# Patient Record
Sex: FEMALE | NOT HISPANIC OR LATINO | Employment: OTHER | URBAN - NONMETROPOLITAN AREA
[De-identification: names, ages, dates, MRNs, and addresses within clinical notes are randomized per-mention and may not be internally consistent; named-entity substitution may affect disease eponyms.]

---

## 2021-01-22 ENCOUNTER — IMMUNIZATION (OUTPATIENT)
Dept: VACCINE CLINIC | Facility: HOSPITAL | Age: 76
End: 2021-01-22

## 2021-01-22 PROCEDURE — 0001A: CPT | Performed by: THORACIC SURGERY (CARDIOTHORACIC VASCULAR SURGERY)

## 2021-01-22 PROCEDURE — 91300 HC SARSCOV02 VAC 30MCG/0.3ML IM: CPT | Performed by: THORACIC SURGERY (CARDIOTHORACIC VASCULAR SURGERY)

## 2021-02-12 ENCOUNTER — IMMUNIZATION (OUTPATIENT)
Dept: VACCINE CLINIC | Facility: HOSPITAL | Age: 76
End: 2021-02-12

## 2021-02-12 PROCEDURE — 0002A: CPT | Performed by: THORACIC SURGERY (CARDIOTHORACIC VASCULAR SURGERY)

## 2021-02-12 PROCEDURE — 91300 HC SARSCOV02 VAC 30MCG/0.3ML IM: CPT | Performed by: THORACIC SURGERY (CARDIOTHORACIC VASCULAR SURGERY)

## 2023-09-11 ENCOUNTER — TELEPHONE (OUTPATIENT)
Dept: NEUROLOGY | Facility: CLINIC | Age: 78
End: 2023-09-11

## 2023-09-11 NOTE — TELEPHONE ENCOUNTER
Provider: SAMANTHA   Caller: TOI WILDER   Relationship to Patient: PATIENT     Phone Number: [phone]  Reason for Call: PATIENT CALLED IN STATES THAT THE ED DETERMINED THAT SHE HAS HAD A TIA AND WANTS HER TO BE SEEN BY MIGUEL YUN. PATIENT STATES THEY DID GIVE HER A REFERRAL TO COME SEE OUR OFFICE AS WELL.     PLEASE CALL PATIENT AND ADVISE    Residential stability/Employment stability/Insight into violence risk and need for management/treatment/Good treatment response/compliance

## 2024-01-18 ENCOUNTER — OFFICE VISIT (OUTPATIENT)
Dept: ORTHOPEDIC SURGERY | Facility: CLINIC | Age: 79
End: 2024-01-18
Payer: MEDICARE

## 2024-01-18 VITALS
BODY MASS INDEX: 27.49 KG/M2 | WEIGHT: 165 LBS | DIASTOLIC BLOOD PRESSURE: 82 MMHG | SYSTOLIC BLOOD PRESSURE: 132 MMHG | HEIGHT: 65 IN

## 2024-01-18 DIAGNOSIS — S46.011A TRAUMATIC COMPLETE TEAR OF RIGHT ROTATOR CUFF, INITIAL ENCOUNTER: Primary | ICD-10-CM

## 2024-01-18 DIAGNOSIS — M19.019 AC JOINT ARTHROPATHY: ICD-10-CM

## 2024-01-18 DIAGNOSIS — M94.211 CHONDROMALACIA OF RIGHT SHOULDER: ICD-10-CM

## 2024-01-18 RX ORDER — LIDOCAINE HYDROCHLORIDE 10 MG/ML
4 INJECTION, SOLUTION EPIDURAL; INFILTRATION; INTRACAUDAL; PERINEURAL
Status: COMPLETED | OUTPATIENT
Start: 2024-01-18 | End: 2024-01-18

## 2024-01-18 RX ORDER — TRIAMCINOLONE ACETONIDE 40 MG/ML
80 INJECTION, SUSPENSION INTRA-ARTICULAR; INTRAMUSCULAR
Status: COMPLETED | OUTPATIENT
Start: 2024-01-18 | End: 2024-01-18

## 2024-01-18 RX ADMIN — TRIAMCINOLONE ACETONIDE 80 MG: 40 INJECTION, SUSPENSION INTRA-ARTICULAR; INTRAMUSCULAR at 15:03

## 2024-01-18 RX ADMIN — LIDOCAINE HYDROCHLORIDE 4 ML: 10 INJECTION, SOLUTION EPIDURAL; INFILTRATION; INTRACAUDAL; PERINEURAL at 15:03

## 2024-01-18 NOTE — PROGRESS NOTES
"Subjective:     Patient ID: Huyen Alvarez is a 78 y.o. female.    Chief Complaint:  Right shoulder pain, new patient  History of Present Illness  Huyen Alvarez presents to clinic today for evaluation of right shoulder pain, new onset back in 09/2023.     She states that she had a TIA at that time. She is unsure of exactly what happened but feels like she may have fallen onto her right shoulder, as she has had significant pain to her right shoulder since that time, localized to the anterolateral aspect of the shoulder. She rates it as a 6 out of 10, aching in nature with occasional sharp pain. She has noticed some associated throbbing, mild radiation down the anterolateral aspect of the upper extremity, but not below the level of the elbow. It is worse with overhead lifting, pushing, pulling activities, as well as any resisted activities with the upper extremity and does note some associated weakness. She denies any janice numbness or tingling. She denies any fever, chills, or sweats. She has had no significant improvement with home exercise for the last 6 weeks and anti-inflammatory medications. She denies prior injury to her right shoulder, and she is right hand dominant.     Social History     Occupational History    Not on file   Tobacco Use    Smoking status: Never     Passive exposure: Never    Smokeless tobacco: Never   Vaping Use    Vaping Use: Never used   Substance and Sexual Activity    Alcohol use: Never    Drug use: Never    Sexual activity: Defer      History reviewed. No pertinent past medical history.  Past Surgical History:   Procedure Laterality Date    CYST REMOVAL         Family History   Problem Relation Age of Onset    Cancer Mother          Review of Systems        Objective:  Vitals:    01/18/24 1356   BP: 132/82   BP Location: Right arm   Weight: 74.8 kg (165 lb)   Height: 165.1 cm (65\")         01/18/24  1356   Weight: 74.8 kg (165 lb)     Body mass index is 27.46 kg/m².  Physical " Exam    Vital signs reviewed.   General: No acute distress, alert and oriented  Eyes: conjunctiva clear; pupils equally round and reactive  ENT: external ears and nose atraumatic; oropharynx clear  CV: no peripheral edema  Resp: normal respiratory effort  Skin: no rashes or wounds; normal turgor  Psych: mood and affect appropriate; recent and remote memory intact        Ortho Exam     Right shoulder-  Mild tenderness along the anterior and posterior glenohumeral joint line with minimal crepitus  FF- Active- 160 degrees  Strength- 4-/5  ER- Active- 45 degrees  Strength- 4/5  IR T12  Strength- 4+/5  Empty can test- Positive  Drop arm test- Negative  Ext rotation lag sign- Negative  Neer's sign- Positive  Shah- Positive  Cross arm test- Negative  Tenderness over AC joint- Mild  Brisk cap refill to all digits, palpable 1+ radial pulse right wrist  Positive deltoid firing in all three components  Positive sensation to light touch to all distributions, right hand symmetric to the left  Imaging:    MR shoulder right wo IV contrast  Order: [id number]  Narrative    HISTORY: Shoulder pain.    COMPARISON: None.    TECHNIQUE:    Multiplanar, multisequence MR images of the right shoulder were performed without gadolinium contrast. Study is degraded by motion.    FINDINGS:    BONE MARROW: Overall within normal limits. Some reactive marrow changes of the greater tuberosity of the humerus.  SOFT TISSUES: Muscular and fascial planes are well-maintained. Major neurovascular structures are normal.    ACROMIOCLAVICULAR JOINT: Joint space loss associated with capsular hypertrophy and bony spurring. There is lateral acromial downsloping. Type 1 acromion.  SUBACROMIAL/SUBDELTOID BURSA: Moderate bursal distention.    ROTATOR CUFF: Full thickness tear of the anterior half of the supraspinatus tendon retracted over the humeral head. Articular surface partial tear of the remaining tendon. Rotator cuff is otherwise intact. There is some  subscapularis tendinosis and infraspinatus tendinosis.  BICEPS TENDON: Properly position the bicipital groove. Linear tear suspected at the attachment communicating labral tear.    GLENOHUMERAL JOINT: Currently anatomically aligned. Moderate joint effusion with moderate fluid distention of subcoracoid bursa. Overall the cartilage surfaces appear maintained.  LABRUM: L shaped linear tear of the anterosuperior through posterior superior labral tear which does communicate with the biceps anchor.  CAPSULAR LIGAMENTS: IGHL is intact.    IMPRESSION:  1. Motion degraded and limited exam.    2. Linear tear of the superior portions of labrum with involvement of the bicipital anchor. Moderate glenohumeral joint effusion with bursal distention.    3. Full-thickness tear of the anterior supraspinatus. Tendinosis of infraspinatus and subscapularis.    4. 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 Please correlate for symptoms of impingement.    Dictated by: Dane Will MD Signed by Dane Will MD on 1/10/2024 7:57  ##### Final #####    Dictated by:    Dane Will  Dictated DT/TM: 01/10/2024 7:57 am  Interpreted and electronically signed by:  Dane Will  Signed DT/TM:  01/10/2024 7:57 am  Exam End: --    Specimen Collected: 24 12:08 Last Resulted: 01/10/24 07:59   Received From: [facility]  Result Received: 01/15/24 00:05    XR Shoulder Comp Min 2 Vw RT  Order: [id number]  Narrative    REVIEWING YOUR TEST RESULTS IN [facility] IS NOT A SUBSTITUTE FOR DISCUSSING THOSE RESULTS WITH YOUR HEALTH CARE PROVIDER.  PLEASE CONTACT YOUR PROVIDER VIA [facility] TO DISCUSS ANY QUESTIONS OR CONCERNS YOU MAY HAVE REGARDING THESE TEST RESULTS.    RADIOLOGY REPORT    FACILITY:  [facility]  UNIT/AGE/GENDER: OSCAR  OP      AGE:78 Y          SEX:F  PATIENT NAME/:  TOI WILDER W    1945  UNIT NUMBER:  [id number]  ACCOUNT NUMBER:   [id number]  ACCESSION NUMBER:  [id number]    EXAMINATION: XR SHOULDER COMP MIN 2 VW RT    HISTORY: Right shoulder pain    FINDINGS: 4 views of the right shoulder are submitted for  interpretation without comparison.    There is moderate right acromioclavicular joint osteoarthritis.  Glenohumeral joint space is normal. Normal alignment. No acute  fracture.    IMPRESSION:    1. Moderate right acromioclavicular joint osteoarthritis.      Dictated by: Jose Pretty M.D.    Images and Report reviewed and interpreted by: Jose Pretty M.D.    <PS><Electronically signed by: Jose Pretty M.D.>  11/27/2023 1138    D: 11/27/2023 1136  T: 11/27/2023 1136  All Measurements     Exam End: 11/27/23 11:24    Specimen Collected: 11/27/23 11:36 Last Resulted: 11/27/23 11:39   Received From: [facility]  Result Received: 01/18/24 13:46       Review of outside x-rays right shoulder as well as MRI right shoulder including review of imaging as well as radiology report indicates full-thickness partial width tear of the anterior portion of the supraspinatus, mild chondromalacia of the glenohumeral joint on both x-ray and MRI with glenohumeral effusion noted.  Moderate degenerative change the AC joint.  Moderate degenerative tearing of superior labrum at biceps tendon anchor site.    Assessment:        1. Traumatic complete tear of right rotator cuff, initial encounter    2. Chondromalacia of right shoulder    3. AC joint arthropathy           Plan:  - Large Joint Arthrocentesis: R subacromial bursa on 1/18/2024 3:03 PM  Indications: pain  Details: 22 G needle, lateral approach  Medications: 80 mg triamcinolone acetonide 40 MG/ML; 4 mL lidocaine PF 1% 1 %  Outcome: tolerated well, no immediate complications  Procedure, treatment alternatives, risks and benefits explained, specific risks discussed. Consent was given by the patient. Immediately prior to procedure a time out was called to verify the correct patient, procedure,  equipment, support staff and site/side marked as required. Patient was prepped and draped in the usual sterile fashion.             1. I discussed treatment options at length with patient at today's visit. I reviewed with her that she does have a partial width full thickness tear of the anterior portion of her supraspinatus based on review of her MRI. We did discuss option for rotator cuff repair. She would like to hold off on surgical treatment at this time.  2. We also discussed option for subacromial injection and continued home exercises to try to strengthen the remaining portions of her cuff that are intact. She was in agreement with this plan.  3. Patient would like to proceed with cortisone injection today to the right shoulder subacromial space. Recommended limited use of affected extremity for the next 24 hours to only essential activities other than work on general active and passive motion. Recommended supplementing with ice and soft tissue massage. Discussed with patient that they should see results in 5 to7 days, if no improvement in 5 to 6 weeks I have asked them to call the office to review other options. Patient should call office immediately if they notice redness, warmth, fevers, chills, or residual numbness or tingling for greater than 6 hours after injection.  4. She is to continue with home exercise for periscapular strengthening and therapy program for rotator cuff deficient shoulder.  5. She will follow up in 3 months or sooner if needed.      Huyen Jones was in agreement with plan and had all questions answered.     Orders:  Orders Placed This Encounter   Procedures    - Large Joint Arthrocentesis: R subacromial bursa    XR Shoulder 2+ View Right       Medications:  No orders of the defined types were placed in this encounter.      Followup:  No follow-ups on file.    Diagnoses and all orders for this visit:    1. Traumatic complete tear of right rotator cuff, initial encounter (Primary)  -      XR Shoulder 2+ View Right    2. Chondromalacia of right shoulder    3. AC joint arthropathy    Other orders  -     - Large Joint Arthrocentesis: R subacromial bursa          Dictated utilizing Dragon dictation     Transcribed from ambient dictation for Vin Almanza MD by Solange Alvarez.  01/18/24   15:26 EST    Patient or patient representative verbalized consent to the visit recording.  I have personally performed the services described in this document as transcribed by the above individual, and it is both accurate and complete.

## 2024-03-29 ENCOUNTER — TELEPHONE (OUTPATIENT)
Dept: ORTHOPEDIC SURGERY | Facility: CLINIC | Age: 79
End: 2024-03-29
Payer: MEDICARE

## 2024-03-29 NOTE — TELEPHONE ENCOUNTER
CALLED PATIENT, LEFT VOICEMAIL FOR PATIENT  STATED WE NEED TO CHANGE HER LOCATION FOR HER APPOINTMENT ON 4- AS DR SUAREZ WILL NOT BE AT [facility] DUE TO STAFFING ISSUES. ASKED PATIENT TO CALL OUR OFFICE BACK.

## 2024-04-01 ENCOUNTER — TELEPHONE (OUTPATIENT)
Dept: ORTHOPEDIC SURGERY | Facility: CLINIC | Age: 79
End: 2024-04-01
Payer: MEDICARE

## 2024-04-01 NOTE — TELEPHONE ENCOUNTER
SECOND ATTEMPT;  CALLED PATIENT AGAIN, LEFT VOICEMAIL. EXPLAINED WE NEEDED TO MOVE APPOINTMENT ON 4- AT [place] TO [place], SAME TIME DUE TO STAFFING ISSUES.

## 2024-04-18 ENCOUNTER — OFFICE VISIT (OUTPATIENT)
Dept: ORTHOPEDIC SURGERY | Facility: CLINIC | Age: 79
End: 2024-04-18
Payer: MEDICARE

## 2024-04-18 VITALS — WEIGHT: 165 LBS | BODY MASS INDEX: 27.49 KG/M2 | HEIGHT: 65 IN

## 2024-04-18 DIAGNOSIS — M19.019 AC JOINT ARTHROPATHY: ICD-10-CM

## 2024-04-18 DIAGNOSIS — S46.011A TRAUMATIC COMPLETE TEAR OF RIGHT ROTATOR CUFF, INITIAL ENCOUNTER: Primary | ICD-10-CM

## 2024-04-18 DIAGNOSIS — M94.211 CHONDROMALACIA OF RIGHT SHOULDER: ICD-10-CM

## 2024-04-18 PROCEDURE — 73030 X-RAY EXAM OF SHOULDER: CPT | Performed by: ORTHOPAEDIC SURGERY

## 2024-04-18 PROCEDURE — 20610 DRAIN/INJ JOINT/BURSA W/O US: CPT | Performed by: ORTHOPAEDIC SURGERY

## 2024-04-18 PROCEDURE — 99213 OFFICE O/P EST LOW 20 MIN: CPT | Performed by: ORTHOPAEDIC SURGERY

## 2024-04-18 RX ORDER — ATORVASTATIN CALCIUM 20 MG/1
1 TABLET, FILM COATED ORAL DAILY
COMMUNITY
Start: 2024-03-06

## 2024-04-18 RX ADMIN — LIDOCAINE HYDROCHLORIDE 4 ML: 10 INJECTION, SOLUTION EPIDURAL; INFILTRATION; INTRACAUDAL; PERINEURAL at 11:41

## 2024-04-18 RX ADMIN — TRIAMCINOLONE ACETONIDE 80 MG: 40 INJECTION, SUSPENSION INTRA-ARTICULAR; INTRAMUSCULAR at 11:41

## 2024-04-18 NOTE — PROGRESS NOTES
"Subjective:     Patient ID: Huyen Alvarez is a 78 y.o. female.    Chief Complaint:  Follow-up right shoulder pain, rotator cuff tear, chondromalacia, AC joint arthropathy   Last injection-right shoulder subacromial bursa-1/18/2024    History of Present Illness,   Patient returns to clinic today stating for follow-up evaluation in regards to her right shoulder.    She reports that her previous injection provided significant relief for approximately 3 months. However, last week, she experienced an immediate onset of increased pain after reaching for a plate in her kitchen and suddenly moving her right arm. The pain has been relatively consistent since then. She rates her current pain level as moderate in intensity, described as aching with occasional sharp pain, particularly during any lifting, pushing, or pulling activities. The pain is localized to the anterolateral arm and shoulder, accompanied by mild radiation down the lateral arm and just below the elbow to the mid forearm region. She denies experiencing any associated numbness or tingling, fevers, chills, or sweats.      Social History     Occupational History    Not on file   Tobacco Use    Smoking status: Never     Passive exposure: Never    Smokeless tobacco: Never   Vaping Use    Vaping status: Never Used   Substance and Sexual Activity    Alcohol use: Never    Drug use: Never    Sexual activity: Defer      History reviewed. No pertinent past medical history.  Past Surgical History:   Procedure Laterality Date    CYST REMOVAL         Family History   Problem Relation Age of Onset    Cancer Mother          Review of Systems        Objective:  Vitals:    04/18/24 1016   Weight: 74.8 kg (165 lb)   Height: 165.1 cm (65\")         04/18/24  1016   Weight: 74.8 kg (165 lb)     Body mass index is 27.46 kg/m².  Physical Exam    Vital signs reviewed.   General: No acute distress, alert and oriented  Eyes: conjunctiva clear; pupils equally round and reactive  ENT: " external ears and nose atraumatic; oropharynx clear  CV: no peripheral edema  Resp: normal respiratory effort  Skin: no rashes or wounds; normal turgor  Psych: mood and affect appropriate; recent and remote memory intact        Ortho Exam     Right shoulder-active forward flexion 160 degrees with 4 out of 5 strength, active external rotation 45 degrees, 4-5 strength.  Internal rotation T9, 4+ out of 5 strength on belly press test.  Positive Shah and Neer's, positive empty can test, negative drop arm test, negative external rotation lag sign.    Imaging:  Right Shoulder X-Ray  Indication: Pain  AP, scapular Y, and axillary lateral views    Findings:  No fracture  No bony lesion  Normal soft tissues  Mild glenohumeral joint space narrowing, mild humeral head elevation noted.    Compared to prior x-rays    Assessment:        1. Traumatic complete tear of right rotator cuff, initial encounter    2. Chondromalacia of right shoulder    3. AC joint arthropathy           Plan:    - Large Joint Arthrocentesis: R subacromial bursa on 4/18/2024 11:41 AM  Indications: pain  Details: 22 G needle, lateral approach  Medications: 80 mg triamcinolone acetonide 40 MG/ML; 4 mL lidocaine PF 1% 1 %  Outcome: tolerated well, no immediate complications  Procedure, treatment alternatives, risks and benefits explained, specific risks discussed. Consent was given by the patient. Immediately prior to procedure a time out was called to verify the correct patient, procedure, equipment, support staff and site/side marked as required. Patient was prepped and draped in the usual sterile fashion.           1. Discussed treatment options at length with patient at today's visit. discussed treatment options with patient. Reviewed options for shoulder arthroplasty, physical therapy, and repeat injection. She would like to proceed with the injection again today and continue with home exercises.   2. Patient would like to proceed with cortisone  injection today to the right shoulder subacromial space. Recommended limited use of affected extremity for the next 24 hours to only essential activities other than work on general active and passive motion. Recommended supplementing with ice and soft tissue massage. Discussed with patient that they should see results in 5 to 7 days if no improvement in 5 to 6 weeks I have asked them to call the office to review other options. The patient should call the office immediately if they notice redness, warmth, fevers, chills, or residual numbness or tingling for greater than 6 hours after injection.  3. Continue with home exercises, work on range of motion and strength as tolerated.   4. Follow up. As needed if she has recurrence of symptoms.     The patient was in agreement with plan and had all questions answered.      Huyen Alvarez was in agreement with plan and had all questions answered.     Orders:  Orders Placed This Encounter   Procedures    - Large Joint Arthrocentesis: R subacromial bursa    XR Shoulder 2+ View Right       Medications:  No orders of the defined types were placed in this encounter.      Followup:  Return if symptoms worsen or fail to improve.    Diagnoses and all orders for this visit:    1. Traumatic complete tear of right rotator cuff, initial encounter (Primary)  -     XR Shoulder 2+ View Right    2. Chondromalacia of right shoulder  -     XR Shoulder 2+ View Right    3. AC joint arthropathy  -     XR Shoulder 2+ View Right    Other orders  -     - Large Joint Arthrocentesis: R subacromial bursa          Dictated utilizing Dragon dictation     Transcribed from ambient dictation for Vin Almanza MD by Myrna Tellez.  04/18/24   12:27 EDT    Patient or patient representative verbalized consent to the visit recording.  I have personally performed the services described in this document as transcribed by the above individual, and it is both accurate and complete.

## 2024-04-22 RX ORDER — TRIAMCINOLONE ACETONIDE 40 MG/ML
80 INJECTION, SUSPENSION INTRA-ARTICULAR; INTRAMUSCULAR
Status: COMPLETED | OUTPATIENT
Start: 2024-04-18 | End: 2024-04-18

## 2024-04-22 RX ORDER — LIDOCAINE HYDROCHLORIDE 10 MG/ML
4 INJECTION, SOLUTION EPIDURAL; INFILTRATION; INTRACAUDAL; PERINEURAL
Status: COMPLETED | OUTPATIENT
Start: 2024-04-18 | End: 2024-04-18

## 2024-09-18 ENCOUNTER — OFFICE VISIT (OUTPATIENT)
Dept: ORTHOPEDIC SURGERY | Facility: CLINIC | Age: 79
End: 2024-09-18
Payer: MEDICARE

## 2024-09-18 VITALS — BODY MASS INDEX: 27.16 KG/M2 | WEIGHT: 163 LBS | HEIGHT: 65 IN

## 2024-09-18 DIAGNOSIS — M19.019 AC JOINT ARTHROPATHY: ICD-10-CM

## 2024-09-18 DIAGNOSIS — S46.011A TRAUMATIC COMPLETE TEAR OF RIGHT ROTATOR CUFF, INITIAL ENCOUNTER: Primary | ICD-10-CM

## 2024-09-18 DIAGNOSIS — M94.211 CHONDROMALACIA OF RIGHT SHOULDER: ICD-10-CM

## 2024-09-18 RX ORDER — ATORVASTATIN CALCIUM 40 MG/1
40 TABLET, FILM COATED ORAL
COMMUNITY
Start: 2024-08-23

## 2024-09-18 RX ADMIN — LIDOCAINE HYDROCHLORIDE 4 ML: 10 INJECTION, SOLUTION EPIDURAL; INFILTRATION; INTRACAUDAL; PERINEURAL at 11:51

## 2024-09-18 RX ADMIN — TRIAMCINOLONE ACETONIDE 80 MG: 40 INJECTION, SUSPENSION INTRA-ARTICULAR; INTRAMUSCULAR at 11:51

## 2024-09-26 RX ORDER — LIDOCAINE HYDROCHLORIDE 10 MG/ML
4 INJECTION, SOLUTION EPIDURAL; INFILTRATION; INTRACAUDAL; PERINEURAL
Status: COMPLETED | OUTPATIENT
Start: 2024-09-18 | End: 2024-09-18

## 2024-09-26 RX ORDER — TRIAMCINOLONE ACETONIDE 40 MG/ML
80 INJECTION, SUSPENSION INTRA-ARTICULAR; INTRAMUSCULAR
Status: COMPLETED | OUTPATIENT
Start: 2024-09-18 | End: 2024-09-18

## 2025-04-09 ENCOUNTER — OFFICE VISIT (OUTPATIENT)
Dept: ORTHOPEDIC SURGERY | Facility: CLINIC | Age: 80
End: 2025-04-09
Payer: MEDICARE

## 2025-04-09 VITALS — BODY MASS INDEX: 27.16 KG/M2 | HEIGHT: 65 IN | WEIGHT: 163 LBS

## 2025-04-09 DIAGNOSIS — S46.011A TRAUMATIC COMPLETE TEAR OF RIGHT ROTATOR CUFF, INITIAL ENCOUNTER: ICD-10-CM

## 2025-04-09 DIAGNOSIS — M94.211 CHONDROMALACIA OF RIGHT SHOULDER: ICD-10-CM

## 2025-04-09 DIAGNOSIS — M19.019 AC JOINT ARTHROPATHY: Primary | ICD-10-CM

## 2025-04-09 NOTE — PROGRESS NOTES
Subjective:     Patient ID: Huyen Alvarez is a 79 y.o. female.    Chief Complaint:  Follow-up right shoulder pain, rotator cuff tear, chondromalacia, AC joint arthropathy  Last injection-9/18/2024-right shoulder subacromial bursa  History of Present Illness  The patient returns to the clinic today for a follow-up evaluation regarding her right shoulder.    She reports escalating pain levels in her right shoulder, which have particularly worsened over the past 3 to 4 weeks. The pain is rated as a 7 to 8 out of 10, described as aching, and localized over the anterior, lateral, and superior aspects of the shoulder. She experiences intermittent pain, likening it to a toothache. There is no associated numbness, tingling, fevers, chills, or sweats. Despite minimal improvement with home exercises, activity modification, stretching, ice, and heat, which she has been performing for over 12 weeks, the pain persists. She experienced significant relief following her last injection approximately 6 months ago.  Social History     Occupational History   • Not on file   Tobacco Use   • Smoking status: Never     Passive exposure: Never   • Smokeless tobacco: Never   Vaping Use   • Vaping status: Never Used   Substance and Sexual Activity   • Alcohol use: Never   • Drug use: Never   • Sexual activity: Defer      Past Medical History:   Diagnosis Date   • Atrial fibrillation      Past Surgical History:   Procedure Laterality Date   • CYST REMOVAL         Family History   Problem Relation Age of Onset   • Cancer Mother          Review of Systems        Objective:  There were no vitals filed for this visit.  There were no vitals filed for this visit.  There is no height or weight on file to calculate BMI.    Physical Exam    Vital signs reviewed.   General: No acute distress, alert and oriented  Eyes: conjunctiva clear; pupils equally round and reactive  ENT: external ears and nose atraumatic; oropharynx clear  CV: no peripheral  edema  Resp: normal respiratory effort  Skin: no rashes or wounds; normal turgor  Psych: mood and affect appropriate; recent and remote memory intact       Physical Exam  The patient's right shoulder shows active forward flexion of 150 degrees with 4 out of 5 strength, external rotation of 40 degrees with 4- out of 5 strength, internal rotation to L2 with 5 out of 5 strength. Negative belly press test, negative bear hug sign, positive Antwan's can test, positive Shah, positive Neer's, mildly positive drop arm test, negative external rotation lag sign. Maximal tenderness to palpation at the anterior and posterior glenohumeral joint line with moderate crepitus. Brisk capillary refill in all digits, 2+ radial pulse in the right wrist, positive deltoid firing in all three components.        Imaging:  None today  Assessment:        1. AC joint arthropathy    2. Chondromalacia of right shoulder    3. Traumatic complete tear of right rotator cuff, initial encounter             Assessment & Plan  1. Right shoulder pain.  She reports increasing levels of pain in the right shoulder, rated as 7-8 out of 10, localized over the anterior, lateral, and superior aspects. The pain is described as aching and similar to a toothache. She has experienced minimal improvement with home exercises, activity modification, stretching, ice, and heat over the past 12 weeks. On examination, there is active forward flexion to 150 degrees, external rotation to 40 degrees, and internal rotation to L2. Strength is 4 out of 5 for forward flexion and external rotation, and 5 out of 5 for internal rotation. Positive tests include empty can test, Shah, Neer's, and mildly positive drop arm test. There is maximal tenderness to palpation at the anterior and posterior glenohumeral joint line with moderate crepitus. Treatment options were discussed at length.     Given her recurrence of symptoms, she opted for a repeat injection today. The option for  reverse shoulder arthroplasty was discussed, but she prefers to hold off on surgical treatment at this point. A referral for formal physical therapy has been sent. All questions were answered. If there is a significant recurrence of symptoms, other options including repeat injections and surgery will be considered.    PROCEDURE  A repeat injection was administered in the right shoulder.        Large Joint Arthrocentesis: R subacromial bursa  Date/Time: 5/4/2025 11:47 PM  Consent given by: patient  Site marked: site marked  Supporting Documentation  Indications: pain   Procedure Details  Location: shoulder - R subacromial bursa  Preparation: Patient was prepped and draped in the usual sterile fashion  Needle size: 22 G  Medications administered: 4 mL lidocaine 1 %, 2 mL triamcinolone acetonide 40 MG/ML  Patient tolerance: patient tolerated the procedure well with no immediate complications        Huyen Alvarez was in agreement with plan and had all questions answered.     Orders:  No orders of the defined types were placed in this encounter.      Medications:  No orders of the defined types were placed in this encounter.      Followup:  No follow-ups on file.    Diagnoses and all orders for this visit:    1. AC joint arthropathy (Primary)    2. Chondromalacia of right shoulder    3. Traumatic complete tear of right rotator cuff, initial encounter        BMI is >= 25 and <30. (Overweight) The following options were offered after discussion;: exercise counseling/recommendations          Dictated utilizing Dragon dictation     Patient or patient representative verbalized consent for the use of Ambient Listening during the visit with  Vin Almanza MD for chart documentation. 4/9/2025  13:55 EDT

## 2025-04-29 ENCOUNTER — HOSPITAL ENCOUNTER (OUTPATIENT)
Dept: PHYSICAL THERAPY | Facility: HOSPITAL | Age: 80
Setting detail: THERAPIES SERIES
Discharge: HOME | End: 2025-04-29
Payer: MEDICARE

## 2025-04-29 DIAGNOSIS — M19.019 AC JOINT ARTHROPATHY: Primary | ICD-10-CM

## 2025-04-29 DIAGNOSIS — M94.211 CHONDROMALACIA OF RIGHT SHOULDER: ICD-10-CM

## 2025-04-29 DIAGNOSIS — S46.011A TRAUMATIC TEAR OF RIGHT ROTATOR CUFF, INITIAL ENCOUNTER: ICD-10-CM

## 2025-04-29 PROCEDURE — 97161 PT EVAL LOW COMPLEX 20 MIN: CPT

## 2025-04-29 PROCEDURE — G0283 ELEC STIM OTHER THAN WOUND: HCPCS

## 2025-04-29 PROCEDURE — 97110 THERAPEUTIC EXERCISES: CPT

## 2025-04-29 NOTE — THERAPY EVALUATION
"  Outpatient Physical Therapy Ortho Initial Evaluation  [facility]     Patient Name: Huyen Alvarez  : 1945  MRN: [id number]  Today's Date: 2025        Visit Date: 2025    There is no problem list on file for this patient.       Past Medical History:   Diagnosis Date    Arthritis     Atrial fibrillation     Hypertension     Stroke     TIA (transient ischemic attack)         Past Surgical History:   Procedure Laterality Date    CYST REMOVAL         Visit Dx:     ICD-10-CM ICD-9-CM   1. AC joint arthropathy  M19.019 719.91   2. Chondromalacia of right shoulder  M94.211 733.92   3. Traumatic tear of right rotator cuff, initial encounter  S46.011A 840.4          Patient History       Row Name 25 1000             History    Chief Complaint Difficulty with daily activities;Muscle tenderness;Muscle weakness;Pain;Tightness;Joint stiffness  -LN      Type of Pain Shoulder pain  R shoulder/UT area  -LN      Date Current Problem(s) Began --  few years ago  -LN      Brief Description of Current Complaint Pt with hx of R shoulder pain; a few years ago she had a TIA and she passed out. \"I think I must have fallen on my right shoulder and injured that shoulder.\" \"It has bothered me since then.\" 1 month ago she had an episode where she couldn't lift her arm up over her head and it lasted about a day.\" Pt reports occasional throbbing/pulsating pain in R shoulder. Has occasional N/T in hands; but no other radiating pain or N/T R UE.  \"The doctor doesn't know if it could be my shoulder or maybe my neck.\" \"I have been getting cortisone injections in my R shoulder every 3-6 months and they do help.\" Last one was 1 month ago with benefit. MRI showed a linear tear of the superior portions of labrum with involvement of the bicipital anchor. Moderate glenohumeral joint effusion with bursal distention; Full-thickness tear of the anterior supraspinatus. Tendinosis of infraspinatus and subscapularis;Acromial clavicular " "osteoarthritis with lateral acromial downsloping and underlying bursitis with mass effect. Pt dx with R AC joint arthropathy; traumatic complete tear of R RC; and chondromalaica of R shoulder.  No plan for any surgery of R shoulder at this time.  -LN      Previous treatment for THIS PROBLEM Injections;Medication  cortisone injections  -LN      Patient/Caregiver Goals Relieve pain;Improve mobility;Improve strength;Know what to do to help the symptoms  -LN      Patient/Caregiver Goals Comment \"for shoulder to feel better.\"  -LN      Hand Dominance right-handed  -LN      Occupation/sports/leisure activities retired; but very active. Volunteers at her [employer]  -LN      Patient seeing anyone else for problem(s)? Ortho  -LN      How has patient tried to help current problem? cortisone injections; rest;  -LN      What clinical tests have you had for this problem? X-ray;MRI  -LN      Results of Clinical Tests MRI= Linear tear of the superior portions of labrum with involvement of the bicipital anchor. Moderate glenohumeral joint effusion with bursal distention.     3. Full-thickness tear of the anterior supraspinatus. Tendinosis of infraspinatus and subscapularis.     4. 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 Please correlate for symptoms of impingement. X-ray= No fracture  No bony lesion  Normal soft tissues  Mild humeral head elevation, mild glenohumeral joint space narrowing  -LN      Related/Recent Hospitalizations No  -LN      Surgery/Hospitalization n/a  -LN      History of Previous Related Injuries had a fall when she had a TIA & thinks she landed on her right shoulder (a few years ago)  -LN         Pain     Pain Location Shoulder  Right; UT area  -LN      Pain at Present 0  -LN      Pain at Best 0  -LN      Pain at Worst 8  -LN      Pain Frequency Intermittent  -LN      Pain Description Throbbing;Tightness  Pulsating  -LN      What Performance " Factors Make the Current Problem(s) WORSE? nothing specific flares it up but has trouble using it and lying on R side when it is flared up  -LN      What Performance Factors Make the Current Problem(s) BETTER? rest; keeping right arm close to body; putting hand on R UT area/applying pressure  -LN      Tolerance Time- Lying limited on R side when shoulder is flared up  -LN      Is your sleep disturbed? Yes  -LN      Is medication used to assist with sleep? Yes  when shoulder is flared up  -LN      What position do you sleep in? Right sidelying;Left sidelying  -LN      Difficulties at work? retired  -LN      Difficulties with ADL's? not normally  -LN      Difficulties with recreational activities? none reported  -LN         Fall Risk Assessment    Any falls in the past year: No  -LN         Services    Prior Rehab/Home Health Experiences Yes  -LN      When was the prior experience with Rehab/Home Health after she had the TIA- 1 visit; few years ago  -LN      Where was the prior experience with Rehab/Home Health at [facility]  -LN      Are you currently receiving Home Health services No  -LN      Do you plan to receive Home Health services in the near future No  -LN         Daily Activities    Primary Language English  -LN      Are you able to read Yes  -LN      Are you able to write Yes  -LN      How does patient learn best? Listening;Demonstration  -LN      Teaching needs identified Home Exercise Program;Management of Condition;Other (comment)  Risks and benefits of treatment explained to pt.  -LN      Patient is concerned about/has problems with Bed Mobility;Flexibility;Grasping objects lifting;Performing home management (household chores, shopping, care of dependents);Performing job responsibilities/community activities (work, school,;Reaching over head;Repetitive movements of the hand, arm, shoulder;Difficulty with self care (i.e. bathing, dressing, toileting:  -LN      Does patient have problems with  the following? None  -LN      Barriers to learning None  -LN      Pt Participated in POC and Goals Yes  -LN         Safety    Are you being hurt, hit, or frightened by anyone at home or in your life? No  -LN      Are you being neglected by a caregiver No  -LN      Have you had any of the following issues with N/A  -LN                User Key  (r) = Recorded By, (t) = Taken By, (c) = Cosigned By      Initials Name Provider Type    Binta Lopes, PT Physical Therapist                     PT Ortho       Row Name 04/29/25 1000       Subjective    Subjective Comments Pt does report her neck feels stiff at times, but no neck pain reported. Pain comes and goes in R shoulder/UT area. No radiating N/T reported except does report occasional N/T in B hands.  -LN       Precautions and Contraindications    Precautions/Limitations no known precautions/limitations  -LN       Subjective Pain    Able to rate subjective pain? yes  -LN    Pre-Treatment Pain Level 0  -LN    Subjective Pain Comment She did have a little soreness after evaluation; but improved after IFC/MH.  -LN       Posture/Observations    Forward Head Mild  -LN    Rounded Shoulders Bilateral:;Mild;Moderate  -LN       Shoulder Impingement/Rotator Cuff Special Tests    Full Can Test (RC Lesion) Right:;Positive  -LN    Empty Can Test (RC Lesion) Right:;Positive  -LN    Drop Arm Test (Full Thickness RC Lesion) Right:;Positive  -LN       Shoulder Girdle Palpation    Subacromial Space Right:;Tender  -LN    Supraspinatus Insertion Right:;Tender  -LN    AC Joint Right:;Tender  -LN    Upper Trap Right:;Tender;Guarded/taut;Trigger point  -LN    Levator Scapula Right:;Tender;Guarded/taut;Trigger point  -LN       Head/Neck/Trunk    Neck Extension AROM WFL  -LN    Neck Flexion AROM WFL- tightness  -LN    Neck Lt Lateral Flexion AROM 75%-tightness  -LN    Neck Rt Lateral Flexion AROM 75%-tightness  -LN    Neck Lt Rotation AROM WFL  -LN    Neck Rt Rotation AROM 75%  -LN        Right Upper Ext    Rt Shoulder Abduction AROM 130 degrees- tight  -LN    Rt Shoulder Abduction PROM 180 degrees/scaption-painfree  -LN    Rt Shoulder Flexion AROM 170 degrees  -LN    Rt Shoulder Flexion PROM 180 degrees-pain at end-range (manny in UT and scapula area)  -LN    Rt Shoulder External Rotation AROM 75 degrees-pain  -LN    Rt Shoulder External Rotation PROM 80 degrees-pain  -LN    Rt Shoulder Internal Rotation AROM 85 degrees- pain at end-range  -LN    Rt Shoulder Internal Rotation PROM 90 degrees  -LN    Rt Elbow Extension/Flexion AROM WNL  -LN    Rt Elbow Supination AROM WNL  -LN    Rt Elbow Pronation AROM WNL  -LN       MMT (Manual Muscle Testing)    General MMT Comments L shoulder 5/5; L elbow 5/5.  -LN       MMT Right Upper Ext    Rt Shoulder Flexion MMT, Gross Movement (4+/5) good plus  -LN    Rt Shoulder Extension MMT, Gross Movement (5/5) normal  -LN    Rt Shoulder ABduction MMT, Gross Movement (4-/5) good minus  pain  -LN    Rt Shoulder ADduction MMT, Gross Movement (4+/5) good plus  -LN    Rt Shoulder Internal Rotation MMT, Gross Movement (4+/5) good plus  -LN    Rt Shoulder External Rotation MMT, Gross Movement (4+/5) good plus  -LN    Rt Elbow Flexion MMT, Gross Movement: (4/5) good  -LN    Rt Elbow Extension MMT, Gross Movement: (4/5) good  -LN       Sensation    Sensation WNL? WNL  -LN    Light Touch No apparent deficits  -LN    Additional Comments no c/o any N/T in UE except occasional N/T in B hands.  -LN       Upper Extremity Flexibility    Upper Trapezius Right:;Moderately limited  -LN    Levator Scapula Right:;Moderately limited  -LN    Pect Minor Right:;Mildly limited  -LN       Transfers    Comment, (Transfers) Pt independent with all functional mobility and transfers.  -LN       Gait/Stairs (Locomotion)    Comment, (Gait/Stairs) Pt independent with gait without any AD used.  -LN              User Key  (r) = Recorded By, (t) = Taken By, (c) = Cosigned By      Initials Name  Provider Type    Binta Lopes, PT Physical Therapist                                Therapy Education  Education Details: Pt to work on HEP 1-2 x day as tolerated and use HP/CP PRN.  Given: HEP, Symptoms/condition management, Pain management, Posture/body mechanics  Program: New  How Provided: Verbal, Demonstration, Written  Provided to: Patient  Level of Understanding: Teach back education performed, Verbalized, Demonstrated      PT OP Goals       Row Name 04/29/25 1100          PT Short Term Goals    STG Date to Achieve 05/13/25  -LN     STG 1 Pt to verbally report decreased R shoulder/UT pain to <5/10 at worst with ADLs and everyday activities.  -LN     STG 2 Pt independent with initial HEP issued by therapist.  -LN     STG 3 R shoulder AROM improved to 175 degrees flexion, 150 degrees abduction, 80 degrees ER, & 90 degrees IR to allow for improved functional use of R UE without pain.  -LN     STG 4 R shoulder and elbow strength improved by 1/2 mm grade.  -LN     STG 5 R UT/levator muscle guarding decreased to minimal.  -LN        Long Term Goals    LTG Date to Achieve 05/27/25  -LN     LTG 1 Pt to verbally report decreased R shoulder/UT pain to <3/10 at worst with ADLs and everyday activities.  -LN     LTG 2 Pt independent with more advanced HEP issued by therapist.  -LN     LTG 3 R shoulder AROM improved to WFL-WNL all planes.  -LN     LTG 4 R shoulder strength improved to 4+-5/5.  -LN     LTG 5 R elbow strength improved to 5/5.  -LN     LTG 6 R UT/levator muscle guarding decreased to nominal.  -LN     LTG 7 CROM WFL & painfree all planes.  -LN        Time Calculation    PT Goal Re-Cert Due Date 05/27/25  -LN               User Key  (r) = Recorded By, (t) = Taken By, (c) = Cosigned By      Initials Name Provider Type    Binta Lopes, PT Physical Therapist                     PT Assessment/Plan       Row Name 04/29/25 1100          PT Assessment    Functional Limitations Limitation in  home management;Limitations in community activities;Limitations in functional capacity and performance;Performance in leisure activities;Performance in self-care ADL  -LN     Impairments Impaired flexibility;Joint mobility;Muscle strength;Pain;Posture;Range of motion;Sensation  -LN     Assessment Comments Pt presents with hx of R shoulder pain for a few years after she suffered a TIA and passed out and she fell onto R shoulder and has had c/o shoulder pain since then. Pt did have a MRI which showed R AC joint arthropathy, complete tear of RC/supraspinatus; superior labral tear, and RC tendinosis. Pt presents with c/o intermittent R shoulder/UT pain/throbbing with decreased R shoulder ROM, decreased R shoulder/RC/elbow strength; moderate mm guarding with tenderness palpated R UT/levator area with decreased CROM; occasional disturbed sleep and occasional decreased functional use of R shoulder if if is flared up. She did have a recent episode where she couldn't move or lift her R arm at all for a day; unsure of cause. Pt does report occasional N/T in B hands but no other radiating pain or N/T reported in UE. Pt with sx of R RCT with weakness with UT/levator mm guarding due to compensation. No signs of any nerve impingement from neck at this time.  -LN     Please refer to paper survey for additional self-reported information Yes  -LN     Rehab Potential Good  but guarded due to having chronic R shoulder pain and hx of complete RCT  -LN     Patient/caregiver participated in establishment of treatment plan and goals Yes  -LN     Patient would benefit from skilled therapy intervention Yes  -LN        PT Plan    PT Frequency 1x/week;2x/week  -LN     Predicted Duration of Therapy Intervention (PT) 4 weeks  -LN     Planned CPT's? PT EVAL LOW COMPLEXITY: 65507;PT RE-EVAL: 50546;PT THER PROC EA 15 MIN: 95896;PT MANUAL THERAPY EA 15 MIN: 69520;PT HOT OR COLD PACK TREAT MCARE;PT ELECTRICAL STIM UNATTEND: ;PT ULTRASOUND EA 15  MIN: 91392  -LN     Physical Therapy Interventions (Optional Details) home exercise program;manual therapy techniques;modalities;patient/family education;postural re-education;ROM (Range of Motion);strengthening;stretching;taping  -LN     PT Plan Comments See pt 1-2 x week for therapeutic exercise with HEP; modalities PRN (IFC/MH/CP/US); manual therapy/ STM/MFR PRN; pt and posture education; kinesiotape PRN.  -LN               User Key  (r) = Recorded By, (t) = Taken By, (c) = Cosigned By      Initials Name Provider Type    Binta Lopes, PT Physical Therapist                     Modalities       Row Name 04/29/25 1000             Moist Heat    MH Applied Yes  -LN      Location R shoulder with IFC with pt supine in hooklying.  -LN      PT Moist Heat Minutes --  15 minutes  -LN      MH S/P Rx Yes  -LN         ELECTRICAL STIMULATION    Attended/Unattended Unattended  -LN      Stimulation Type IFC  -LN      Location/Electrode Placement/Other R UT/superior shoulder (AC joint); lateral shoulder and posterior shoulder with MH.  -LN      PT E-Stim Unattended Minutes 15  -LN                User Key  (r) = Recorded By, (t) = Taken By, (c) = Cosigned By      Initials Name Provider Type    Binta Lopes, PT Physical Therapist                   OP Exercises       Row Name 04/29/25 1000             Precautions    Existing Precautions/Restrictions no known precautions/restrictions  -LN         Subjective    Subjective Comments Pt does report her neck feels stiff at times, but no neck pain reported. Pain comes and goes in R shoulder/UT area. No radiating N/T reported except does report occasional N/T in B hands.  -LN         Subjective Pain    Able to rate subjective pain? yes  -LN      Pre-Treatment Pain Level 0  -LN      Subjective Pain Comment She did have a little soreness after evaluation; but improved after IFC/MH.  -LN         Total Minutes    83129 - PT Therapeutic Exercise Minutes 15  -LN          Exercise 1    Exercise Name 1 supine wrist grab for AA shoulder flexion  -LN      Cueing 1 Verbal;Tactile;Demo  -LN      Reps 1 5  -LN      Time 1 5 sec  -LN      Additional Comments to progress to 10 reps at home as tolerated  -LN         Exercise 2    Exercise Name 2 supine cane exercise for AA shoulder ER  -LN      Cueing 2 Verbal;Tactile;Demo  -LN      Reps 2 5  -LN      Time 2 5 sec  -LN      Additional Comments to progress to 10 reps at home as tolerated  -LN         Exercise 3    Exercise Name 3 Cross body arm stretch  -LN      Cueing 3 Verbal;Tactile;Demo  -LN      Reps 3 5  -LN      Time 3 10 sec  -LN         Exercise 4    Exercise Name 4 Scapular retraction  -LN      Cueing 4 Verbal;Tactile;Demo  -LN      Reps 4 5  -LN      Time 4 10 sec  -LN      Additional Comments to progress to 10 reps at home as tolerated  -LN         Exercise 5    Exercise Name 5 Codman pendulum cw & ccw  -LN      Cueing 5 Verbal;Tactile;Demo  -LN      Reps 5 10-15 each  -LN         Exercise 6    Exercise Name 6 UT stretch (R) with hand on head  -LN      Cueing 6 Verbal;Tactile;Demo  -LN      Reps 6 3-5 reps  -LN      Time 6 10 sec  -LN                User Key  (r) = Recorded By, (t) = Taken By, (c) = Cosigned By      Initials Name Provider Type    Binta Lopes, PT Physical Therapist                                  Outcome Measure Options: Quick DASH  Quick DASH  Open a tight or new jar.: Mild Difficulty  Do heavy household chores (e.g., wash walls, wash floors): No Difficulty  Carry a shopping bag or briefcase: No Difficulty  Wash your back: No Difficulty  Use a knife to cut food: No Difficulty  Recreational activities in which you take some force or impact through your arm, should or hand (e.g. golf, hammering, tennis, etc.): Moderate Difficulty  During the past week, to what extent has your arm, shoulder, or hand problem interfered with your normal social activites with family, friends, neighbors or groups?:  Slightly  During the past week, were you limited in your work or other regular daily activities as a result of your arm, shoulder or hand problem?: Slightly Limited  Arm, Shoulder, or hand pain: Moderate  Tingling (pins and needles) in your arm, shoulder, or hand: Mild  During the past week, how much difficulty have you had sleeping because of the pain in your arm, shoulder or hand?: Mild Difficulty  Number of Questions Answered: 11  Quick DASH Score: 20.45         Time Calculation:     Start Time: 1055  Stop Time: 1207  Time Calculation (min): 72 min  Timed Charges  40473 - PT Therapeutic Exercise Minutes: 15  Untimed Charges  PT E-Stim Unattended Minutes: 15  PT Moist Heat Minutes:  (15 minutes)  Total Minutes  Timed Charges Total Minutes: 15  Untimed Charges Total Minutes: 15   Total Minutes: 30     Therapy Charges for Today       Code Description Service Date Service Provider Modifiers Qty    55986754104 HC PT THER PROC EA 15 MIN 4/29/2025 Binta Gaitan, PT GP 1    55220381729 HC PT ELECTRICAL STIM UNATTENDED 4/29/2025 Binta Gaitan, PT  1    01685420026 HC PT EVAL LOW COMPLEXITY 2 4/29/2025 Binta Gaitan, PT GP 1            PT G-Codes  Outcome Measure Options: Quick DASH  Quick DASH Score: 20.45         Binta Gaitan, PT  4/29/2025

## 2025-05-06 ENCOUNTER — HOSPITAL ENCOUNTER (OUTPATIENT)
Dept: PHYSICAL THERAPY | Facility: HOSPITAL | Age: 80
Setting detail: THERAPIES SERIES
Discharge: HOME | End: 2025-05-06
Payer: MEDICARE

## 2025-05-06 DIAGNOSIS — M19.019 AC JOINT ARTHROPATHY: Primary | ICD-10-CM

## 2025-05-06 DIAGNOSIS — M94.211 CHONDROMALACIA OF RIGHT SHOULDER: ICD-10-CM

## 2025-05-06 DIAGNOSIS — S46.011A TRAUMATIC TEAR OF RIGHT ROTATOR CUFF, INITIAL ENCOUNTER: ICD-10-CM

## 2025-05-06 PROCEDURE — 97110 THERAPEUTIC EXERCISES: CPT

## 2025-05-06 PROCEDURE — 97140 MANUAL THERAPY 1/> REGIONS: CPT

## 2025-05-06 PROCEDURE — G0283 ELEC STIM OTHER THAN WOUND: HCPCS

## 2025-05-06 NOTE — THERAPY TREATMENT NOTE
"  Outpatient Physical Therapy Ortho Treatment Note   [place]     Patient Name: Huyen Alvarez  : 1945  MRN: [id number]  Today's Date: 2025        Visit Date: 2025    Visit Dx:    ICD-10-CM ICD-9-CM   1. AC joint arthropathy  M19.019 719.91   2. Chondromalacia of right shoulder  M94.211 733.92   3. Traumatic tear of right rotator cuff, initial encounter  S46.011A 840.4       There is no problem list on file for this patient.       Past Medical History:   Diagnosis Date    Arthritis     Atrial fibrillation     Hypertension     Stroke     TIA (transient ischemic attack)         Past Surgical History:   Procedure Laterality Date    CYST REMOVAL          PT Ortho       Row Name 25 1000       Subjective    Subjective Comments Pt reports she has been exercising her shoulder but \"not as much as I should because I have been dog sitting.\" No present pain reported but reports tightness in R UT.  She reports no issues with her HEP.  -LN       Precautions and Contraindications    Precautions/Limitations no known precautions/limitations  -LN       Subjective Pain    Able to rate subjective pain? yes  -LN    Pre-Treatment Pain Level 0  -LN    Subjective Pain Comment No present pain reported.  -LN              User Key  (r) = Recorded By, (t) = Taken By, (c) = Cosigned By      Initials Name Provider Type    Binta Lopes, PT Physical Therapist                                 PT Assessment/Plan       Row Name 25 1100          PT Assessment    Assessment Comments Pt with overall decreased c/o R shoulder pain, but continues to report persistent tightness and tenderness R UT. She tolerated exercises very well with occasional cueing for proper technique. Good tolerance to AA/PROM R shoulder but still a little limited in ER. She reports benefit from MH/IFC/CP. She tolerated beginning gentle shoulder/RC strengthening/isometrics; will progress as tolerated.  -LN        PT Plan    PT Frequency " "1x/week;2x/week  -LN     PT Plan Comments Continue per POC; progress exercises as tolerated. Add isometrics for shoulder abduction as tolerated. Continue IFC/Mh/CP PRN. Pt education; manual therapy as needed. Consider trial of kinesiotape for R UT inhibition.  -LN               User Key  (r) = Recorded By, (t) = Taken By, (c) = Cosigned By      Initials Name Provider Type    Binta Lopes, PT Physical Therapist                     Modalities       Row Name 05/06/25 1100 05/06/25 1000          Precautions    Existing Precautions/Restrictions --  -LN no known precautions/restrictions  -LN        Moist Heat    MH Applied -- Yes  -LN     Location -- R shoulder with pt seated with R arm supported on a pillow  -LN     PT Moist Heat Minutes -- 10  -LN     MH Prior to Rx -- Yes  -LN        Ice    Ice Applied -- Yes  -LN     Location -- R shoulder with IFC with pt seated in chair with arm supported on a pillow.  -LN     PT Ice Rx Minutes -- --  15 minutes  -LN     Ice S/P Rx -- Yes  -LN     Patient reports will apply ice at home to involved area -- Yes  Pt to use CP & HP PRN at home  -LN        ELECTRICAL STIMULATION    Attended/Unattended --  -LN Unattended  -LN     Stimulation Type --  -LN IFC  -LN     Location/Electrode Placement/Other --  -LN R UT/superior shoulder (AC joint); lateral shoulder and posterior shoulder with CP.  -LN     PT E-Stim Unattended Minutes -- 15  -LN               User Key  (r) = Recorded By, (t) = Taken By, (c) = Cosigned By      Initials Name Provider Type    Binta Lopes, PT Physical Therapist                   OP Exercises       Row Name 05/06/25 1100 05/06/25 1000          Precautions    Existing Precautions/Restrictions --  -LN no known precautions/restrictions  -LN        Subjective    Subjective Comments -- Pt reports she has been exercising her shoulder but \"not as much as I should because I have been dog sitting.\" No present pain reported but reports tightness " in R UT.  She reports no issues with her HEP.  -LN        Subjective Pain    Able to rate subjective pain? -- yes  -LN     Pre-Treatment Pain Level -- 0  -LN     Subjective Pain Comment -- No present pain reported.  -LN        Total Minutes    08817 - PT Therapeutic Exercise Minutes 20  -LN --     40338 - PT Manual Therapy Minutes 8  -LN --        Exercise 1    Exercise Name 1 supine wrist grab for AA shoulder flexion  -LN --     Cueing 1 Verbal;Tactile;Demo  -LN --     Reps 1 10  -LN --     Time 1 5 sec  -LN --        Exercise 2    Exercise Name 2 supine cane exercise for AA shoulder ER  -LN --     Cueing 2 Verbal;Tactile;Demo  -LN --     Reps 2 10  -LN --     Time 2 5 sec  -LN --        Exercise 3    Exercise Name 3 Cross body arm stretch  -LN --     Cueing 3 Verbal;Tactile;Demo  -LN --     Reps 3 5  -LN --     Time 3 10 sec  -LN --        Exercise 4    Exercise Name 4 Scapular retraction  -LN --     Cueing 4 Verbal;Tactile;Demo  -LN --     Reps 4 10  -LN --     Time 4 5 sec  -LN --        Exercise 5    Exercise Name 5 Codman pendulum cw & ccw  -LN --     Cueing 5 Verbal;Tactile;Demo  -LN --     Reps 5 20 each  -LN --        Exercise 6    Exercise Name 6 UT stretch (R) with hand on head  -LN --     Cueing 6 Verbal;Tactile;Demo  -LN --     Reps 6 5 reps  -LN --     Time 6 10 sec  -LN --        Exercise 7    Exercise Name 7 Isometric shoulder IR  -LN --     Cueing 7 Verbal;Tactile;Demo  -LN --     Reps 7 10  -LN --     Time 7 5 sec  -LN --     Additional Comments vs other hand  -LN --        Exercise 8    Exercise Name 8 Isometric shoulder ER  -LN --     Cueing 8 Verbal;Tactile;Demo  -LN --     Reps 8 10  -LN --     Time 8 5 sec  -LN --     Additional Comments vs other hand  -LN --               User Key  (r) = Recorded By, (t) = Taken By, (c) = Cosigned By      Initials Name Provider Type    LN Binta Gaitan, PT Physical Therapist                             Manual Rx (Last 36 Hours)       Manual  Treatments       Row Name 05/06/25 1100             Total Minutes    92008 - PT Manual Therapy Minutes 8  -LN         Manual Rx 1    Manual Rx 1 Location R shoulder  -LN      Manual Rx 1 Type AA/PROM for R shoulder flexion; abduction/scaption; IR, and ER.  -LN      Manual Rx 1 Duration 5-7 reps each to tolerance  -LN                User Key  (r) = Recorded By, (t) = Taken By, (c) = Cosigned By      Initials Name Provider Type    Binta Lopes, PT Physical Therapist                        Therapy Education  Education Details: Pt to add isometric shoulder ER and IR to HEP as tolerated (vs other hand). Pt to continue with HEP 1-2 x day and use HP/CP PRN. Reminded pt to only do exercises within a painfree range.  Given: HEP, Symptoms/condition management, Pain management, Posture/body mechanics  Program: New, Reinforced, Progressed (added isometric shoulder IR & ER vs other hand)  How Provided: Verbal, Demonstration, Written  Provided to: Patient  Level of Understanding: Teach back education performed, Verbalized, Demonstrated              Time Calculation:   Start Time: 1102  Stop Time: 1155  Time Calculation (min): 53 min  Timed Charges  69845 - PT Therapeutic Exercise Minutes: 20  64465 - PT Manual Therapy Minutes: 8  Untimed Charges  PT E-Stim Unattended Minutes: 15  PT Moist Heat Minutes: 10  PT Ice Rx Minutes:  (15 minutes)  Total Minutes  Timed Charges Total Minutes: 28  Untimed Charges Total Minutes: 25   Total Minutes: 28  Therapy Charges for Today       Code Description Service Date Service Provider Modifiers Qty    38039275896 HC PT THER PROC EA 15 MIN 5/6/2025 Binta Gaitan, PT GP 1    69531963705 HC PT MANUAL THERAPY EA 15 MIN 5/6/2025 Binta Gaitan, PT GP 1    15724500398 HC PT ELECTRICAL STIM UNATTENDED 5/6/2025 Binta Gaitan, PT  1                      Binta Gaitan, PT  5/6/2025

## 2025-05-08 ENCOUNTER — HOSPITAL ENCOUNTER (OUTPATIENT)
Dept: PHYSICAL THERAPY | Facility: HOSPITAL | Age: 80
Setting detail: THERAPIES SERIES
Discharge: HOME | End: 2025-05-08
Payer: MEDICARE

## 2025-05-08 DIAGNOSIS — S46.011A TRAUMATIC TEAR OF RIGHT ROTATOR CUFF, INITIAL ENCOUNTER: ICD-10-CM

## 2025-05-08 DIAGNOSIS — M94.211 CHONDROMALACIA OF RIGHT SHOULDER: ICD-10-CM

## 2025-05-08 DIAGNOSIS — M19.019 AC JOINT ARTHROPATHY: Primary | ICD-10-CM

## 2025-05-08 PROCEDURE — G0283 ELEC STIM OTHER THAN WOUND: HCPCS

## 2025-05-08 PROCEDURE — 97110 THERAPEUTIC EXERCISES: CPT

## 2025-05-08 PROCEDURE — 97140 MANUAL THERAPY 1/> REGIONS: CPT

## 2025-05-08 NOTE — THERAPY TREATMENT NOTE
"  Outpatient Physical Therapy Ortho Treatment Note   [place]     Patient Name: Huyen Alvarez  : 1945  MRN: [id number]  Today's Date: 2025        Visit Date: 2025    Visit Dx:    ICD-10-CM ICD-9-CM   1. AC joint arthropathy  M19.019 719.91   2. Chondromalacia of right shoulder  M94.211 733.92   3. Traumatic tear of right rotator cuff, initial encounter  S46.011A 840.4       There is no problem list on file for this patient.       Past Medical History:   Diagnosis Date    Arthritis     Atrial fibrillation     Hypertension     Stroke     TIA (transient ischemic attack)         Past Surgical History:   Procedure Laterality Date    CYST REMOVAL          PT Ortho       Row Name 25 1000       Subjective    Subjective Comments \"My shoulder is doing good and I can tell this is helping.\" Pt still reports some tightness in R UT but a little less reported.  -LN       Precautions and Contraindications    Precautions/Limitations no known precautions/limitations  -LN       Subjective Pain    Able to rate subjective pain? yes  -LN    Pre-Treatment Pain Level 0  -LN    Subjective Pain Comment No present pain reported.  -LN              User Key  (r) = Recorded By, (t) = Taken By, (c) = Cosigned By      Initials Name Provider Type    Binta Lopes, PT Physical Therapist                                 PT Assessment/Plan       Row Name 25 1100          PT Assessment    Assessment Comments Pt continues with overall decreased c/o R shoulder pain, but continues to report some tightness and tenderness R UT, but is decreasing; trial of kinesiotape done today for UT inhibition.  She tolerated exercises very well with occasional cueing for proper technique. Good tolerance to AA/PROM R shoulder but still a little limited in ER.; but improved. She reports benefit from MH/IFC and from exercises. She is progressing nicely with exercises.  -LN        PT Plan    PT Frequency 1x/week;2x/week  -LN     PT " "Plan Comments Continue per POC; progress exercises as tolerated. Assess benefit of kinesiotape for R UT inhibition. Continue IFC/MH/CP PRN. Pt education; manual therapy as needed.  -LN               User Key  (r) = Recorded By, (t) = Taken By, (c) = Cosigned By      Initials Name Provider Type    Binta Lopes, PT Physical Therapist                     Modalities       Row Name 05/08/25 1000             Moist Heat    MH Applied Yes  -LN      Location R shoulder with pt seated with R arm supported on a pillow  -LN      PT Moist Heat Minutes 10  pre-Rx; 15 min post-Rx with IFC  -LN      MH Prior to Rx Yes  -LN      MH S/P Rx Yes  -LN         Ice    Location --  -LN      PT Ice Rx Minutes --  -LN      Ice S/P Rx --  -LN      Patient reports will apply ice at home to involved area --  -LN         ELECTRICAL STIMULATION    Attended/Unattended Unattended  -LN      Stimulation Type IFC  -LN      Location/Electrode Placement/Other R UT/superior shoulder (AC joint); lateral shoulder and posterior shoulder with MH.  Pt requested MH with IFC today.  -LN      PT E-Stim Unattended Minutes 15  -LN         Other Treatment Provided    Taping / Bracing Trial of kinesiotape applied for UT inhibition using 1 \"I\" strip along R UT distal to proximal. Instructed pt in use and care of kinesiotape, including safe removal. Pt to leave tape on up to 5 days and to trim/remove as needed.  -LN                User Key  (r) = Recorded By, (t) = Taken By, (c) = Cosigned By      Initials Name Provider Type    Binta Lopes, PT Physical Therapist                   OP Exercises       Row Name 05/08/25 1100 05/08/25 1000          Precautions    Existing Precautions/Restrictions -- no known precautions/restrictions  -LN        Subjective    Subjective Comments -- \"My shoulder is doing good and I can tell this is helping.\" Pt still reports some tightness in R UT but a little less reported.  -LN        Subjective Pain    Able to " rate subjective pain? -- yes  -LN     Pre-Treatment Pain Level -- 0  -LN     Subjective Pain Comment -- No present pain reported.  -LN        Total Minutes    70801 - PT Therapeutic Exercise Minutes -- 22  -LN     30357 - PT Manual Therapy Minutes 8  -LN --        Exercise 1    Exercise Name 1 -- supine wrist grab for AA shoulder flexion  -LN     Cueing 1 -- Verbal;Tactile;Demo  -LN     Reps 1 -- 10  -LN     Time 1 -- 5 sec  -LN        Exercise 2    Exercise Name 2 -- supine cane exercise for AA shoulder ER  -LN     Cueing 2 -- Verbal;Tactile;Demo  -LN     Reps 2 -- 10  -LN     Time 2 -- 5 sec  -LN        Exercise 3    Exercise Name 3 -- Cross body arm stretch  -LN     Cueing 3 -- Verbal;Tactile;Demo  -LN     Reps 3 -- 5  -LN     Time 3 -- 10 sec  -LN        Exercise 4    Exercise Name 4 -- Scapular retraction  -LN     Cueing 4 -- Verbal;Tactile;Demo  -LN     Reps 4 -- 10  -LN     Time 4 -- 5 sec  -LN        Exercise 5    Exercise Name 5 -- Codman pendulum cw & ccw  -LN     Cueing 5 -- Verbal;Tactile;Demo  -LN     Reps 5 -- 20 each  -LN        Exercise 6    Exercise Name 6 -- UT stretch (R) with hand on head  -LN     Cueing 6 -- Verbal;Tactile;Demo  -LN     Reps 6 -- 5 reps  -LN     Time 6 -- 10 sec  -LN        Exercise 7    Exercise Name 7 -- Isometric shoulder IR  -LN     Cueing 7 -- Verbal;Tactile;Demo  -LN     Reps 7 -- 10  -LN     Time 7 -- 5 sec  -LN     Additional Comments -- vs other hand  -LN        Exercise 8    Exercise Name 8 -- Isometric shoulder ER  -LN     Cueing 8 -- Verbal;Tactile;Demo  -LN     Reps 8 -- 10  -LN     Time 8 -- 5 sec  -LN     Additional Comments -- vs other hand  -LN        Exercise 9    Exercise Name 9 -- Isometric shoulder abduction  -LN     Cueing 9 -- Verbal;Tactile;Demo  -LN     Reps 9 -- 10  -LN     Time 9 -- 5 sec  -LN     Additional Comments -- vs other hand  -LN               User Key  (r) = Recorded By, (t) = Taken By, (c) = Cosigned By      Initials Name Provider Type    LN  Binta Gaitan, PT Physical Therapist                             Manual Rx (Last 36 Hours)       Manual Treatments       Row Name 05/08/25 1100             Total Minutes    60275 - PT Manual Therapy Minutes 8  -LN         Manual Rx 1    Manual Rx 1 Location R shoulder  -LN      Manual Rx 1 Type AA/PROM for R shoulder flexion; abduction/scaption; IR, and ER.  -LN      Manual Rx 1 Duration 7-8 reps each to tolerance  -LN                User Key  (r) = Recorded By, (t) = Taken By, (c) = Cosigned By      Initials Name Provider Type    Binta Lopes, PT Physical Therapist                        Therapy Education  Education Details: Pt to add isometric shoulder abduciton vs other hand to HEP as tolerated. Pt to continue with HEP 1-2 x day and use HP/CP PRN.  Given: HEP, Symptoms/condition management, Pain management, Posture/body mechanics  Program: New, Reinforced, Progressed (added isometric shoulder abduciton vs other hand)  How Provided: Verbal, Demonstration, Written  Provided to: Patient  Level of Understanding: Teach back education performed, Verbalized, Demonstrated              Time Calculation:   Start Time: 1055  Stop Time: 1150  Time Calculation (min): 55 min  Timed Charges  48090 - PT Therapeutic Exercise Minutes: 22  22979 - PT Manual Therapy Minutes: 8  Untimed Charges  PT E-Stim Unattended Minutes: 15  PT Moist Heat Minutes: 10 (pre-Rx; 15 min post-Rx with IFC)  Total Minutes  Timed Charges Total Minutes: 8  Untimed Charges Total Minutes: 25   Total Minutes: 8  Therapy Charges for Today       Code Description Service Date Service Provider Modifiers Qty    48558701402 HC PT THER PROC EA 15 MIN 5/8/2025 Binta Gaitan, PT GP 1    54972152543 HC PT MANUAL THERAPY EA 15 MIN 5/8/2025 Binta Gaitan, PT GP 1    61349460329 HC PT ELECTRICAL STIM UNATTENDED 5/8/2025 Binta Gaitan, PT  1                      iBnta Gaitan, PT  5/8/2025

## 2025-05-15 ENCOUNTER — HOSPITAL ENCOUNTER (OUTPATIENT)
Dept: PHYSICAL THERAPY | Facility: HOSPITAL | Age: 80
Setting detail: THERAPIES SERIES
Discharge: HOME | End: 2025-05-15
Payer: MEDICARE

## 2025-05-15 DIAGNOSIS — M19.019 AC JOINT ARTHROPATHY: Primary | ICD-10-CM

## 2025-05-15 DIAGNOSIS — M94.211 CHONDROMALACIA OF RIGHT SHOULDER: ICD-10-CM

## 2025-05-15 DIAGNOSIS — S46.011A TRAUMATIC TEAR OF RIGHT ROTATOR CUFF, INITIAL ENCOUNTER: ICD-10-CM

## 2025-05-15 PROCEDURE — G0283 ELEC STIM OTHER THAN WOUND: HCPCS

## 2025-05-15 PROCEDURE — 97110 THERAPEUTIC EXERCISES: CPT

## 2025-05-15 PROCEDURE — 97140 MANUAL THERAPY 1/> REGIONS: CPT

## 2025-05-15 NOTE — THERAPY TREATMENT NOTE
Outpatient Physical Therapy Ortho Treatment Note   [facility]     Patient Name: Huyen Alvarez  : 1945  MRN: [id number]  Today's Date: 5/15/2025        Visit Date: 05/15/2025    Visit Dx:    ICD-10-CM ICD-9-CM   1. AC joint arthropathy  M19.019 719.91   2. Traumatic tear of right rotator cuff, initial encounter  S46.011A 840.4   3. Chondromalacia of right shoulder  M94.211 733.92       There is no problem list on file for this patient.       Past Medical History:   Diagnosis Date    Arthritis     Atrial fibrillation     Hypertension     Stroke     TIA (transient ischemic attack)         Past Surgical History:   Procedure Laterality Date    CYST REMOVAL          PT Ortho       Row Name 05/15/25 1100       Right Upper Ext    Rt Shoulder Abduction AROM 180 degrees=-painfree  -LN    Rt Shoulder Abduction PROM 180 degrees/scaption-painfree  -LN    Rt Shoulder Flexion AROM 180 degrees  -LN    Rt Shoulder Flexion PROM 180 degrees  -LN    Rt Shoulder External Rotation AROM 82 degrees  -LN    Rt Shoulder External Rotation PROM 87 degrees  -LN    Rt Shoulder Internal Rotation AROM 90 degrees  -LN    Rt Shoulder Internal Rotation PROM 90 degrees  -LN    Rt Elbow Extension/Flexion AROM WNL  -LN    Rt Elbow Supination AROM WNL  -LN    Rt Elbow Pronation AROM WNL  -LN       MMT Right Upper Ext    Rt Shoulder Flexion MMT, Gross Movement (4+/5) good plus  -LN    Rt Shoulder Extension MMT, Gross Movement (5/5) normal  -LN    Rt Shoulder ABduction MMT, Gross Movement (4/5) good  -LN    Rt Shoulder ADduction MMT, Gross Movement (5/5) normal  -LN    Rt Shoulder Internal Rotation MMT, Gross Movement (4+/5) good plus  -LN    Rt Shoulder External Rotation MMT, Gross Movement (4+/5) good plus  -LN    Rt Elbow Flexion MMT, Gross Movement: (4+/5) good plus  -LN    Rt Elbow Extension MMT, Gross Movement: (4+/5) good plus  -LN       Upper Extremity Flexibility    Upper Trapezius Right:;Mildly limited  -LN      Row Name 05/15/25 1000     "   Subjective    Subjective Comments \"My shoulder is doing good and I can do anything I want with it and it is good.\" She only reports occasional soreness.  -LN       Precautions and Contraindications    Precautions/Limitations no known precautions/limitations  -LN       Subjective Pain    Pre-Treatment Pain Level 0  -LN    Subjective Pain Comment No present pain reported.  -LN              User Key  (r) = Recorded By, (t) = Taken By, (c) = Cosigned By      Initials Name Provider Type    Binta Lopes, PT Physical Therapist                                 PT Assessment/Plan       Row Name 05/15/25 1000          PT Assessment    Assessment Comments Pt with significant decrease in c/o R shoulder pain and she reports now having good functional use of R UE with ADLs and all her daily activities. Pt with improved R shoulder PROM to WFL-WNL all planes; passive ER still limtied by 3 degrees and AROM improved to WFL-WNL all planes; active ER still limited by 8 degrees. R shoulder/elbow strength improved but still has weakness manny in shoulder abduction. Decreased R UT tightness to minimal noted today.  Pt is doing very good with HEP and progressing well. She will benefit from continued PT to progress with ER ROM and strength; manny focusing on abduction/deltoid strength.  -LN        PT Plan    PT Frequency 1x/week  -LN     PT Plan Comments Continue per POC 1 x week; progress exercises as tolerated.  Continue IFC/MH/CP PRN. Pt education; manual therapy as needed. Kinesiotape PRN.  -LN               User Key  (r) = Recorded By, (t) = Taken By, (c) = Cosigned By      Initials Name Provider Type    Binta Lopes, PT Physical Therapist                     Modalities       Row Name 05/15/25 0900             Moist Heat    MH Applied Yes  -LN      Location R shoulder with pt seated with R arm supported on a pillow  -LN      PT Moist Heat Minutes 10  pre-Rx; 15 min post-Rx with IFC  -LN      MH Prior to Rx Yes " " -LN      MH S/P Rx Yes  -LN         ELECTRICAL STIMULATION    Attended/Unattended Unattended  -LN      Stimulation Type IFC  -LN      Location/Electrode Placement/Other R UT/levator/lateral shoulder and posterior shoulder with MH.  Pt requested MH with IFC today.  -LN      PT E-Stim Unattended Minutes 15  -LN         Other Treatment Provided    Taping / Bracing No kinesiotape needed per pt.  -LN                User Key  (r) = Recorded By, (t) = Taken By, (c) = Cosigned By      Initials Name Provider Type    LN Binta Gaitan, PT Physical Therapist                   OP Exercises       Row Name 05/15/25 1100 05/15/25 1000          Precautions    Existing Precautions/Restrictions -- no known precautions/restrictions  -LN        Subjective    Subjective Comments -- \"My shoulder is doing good and I can do anything I want with it and it is good.\" She only reports occasional soreness.  -LN        Subjective Pain    Able to rate subjective pain? -- yes  -LN     Pre-Treatment Pain Level -- 0  -LN     Subjective Pain Comment -- No present pain reported.  -LN        Total Minutes    34179 - PT Therapeutic Exercise Minutes -- 25  -LN     73813 - PT Manual Therapy Minutes 12  -LN --        Exercise 1    Exercise Name 1 -- supine active shoulder flexion  -LN     Cueing 1 -- Verbal;Tactile;Demo  -LN     Reps 1 -- 10  -LN     Time 1 -- 5 sec  -LN        Exercise 2    Exercise Name 2 -- supine cane exercise for AA shoulder ER  -LN     Cueing 2 -- Verbal;Tactile;Demo  -LN     Reps 2 -- 10  -LN     Time 2 -- 5 sec  -LN        Exercise 3    Exercise Name 3 -- Cross body arm stretch  -LN     Cueing 3 -- Verbal;Tactile;Demo  -LN     Reps 3 -- 5  -LN     Time 3 -- 10 sec  -LN        Exercise 4    Exercise Name 4 -- Scapular retraction  -LN     Cueing 4 -- Verbal;Tactile;Demo  -LN     Reps 4 -- 10  -LN     Time 4 -- 5 sec  -LN        Exercise 5    Exercise Name 5 -- Codman pendulum cw & ccw  -LN     Cueing 5 -- " Verbal;Tactile;Demo  -LN     Reps 5 -- 20 each  -LN        Exercise 6    Exercise Name 6 -- UT stretch (R) with hand on head  -LN     Cueing 6 -- Verbal;Tactile;Demo  -LN     Reps 6 -- 5 reps  -LN     Time 6 -- 10 sec  -LN        Exercise 7    Exercise Name 7 -- Isometric shoulder IR  -LN     Cueing 7 -- Verbal;Tactile;Demo  -LN     Reps 7 -- 10  -LN     Time 7 -- 5 sec  -LN     Additional Comments -- vs other hand  -LN        Exercise 8    Exercise Name 8 -- Isometric shoulder ER  -LN     Cueing 8 -- Verbal;Tactile;Demo  -LN     Reps 8 -- 10  -LN     Time 8 -- 5 sec  -LN     Additional Comments -- vs other hand  -LN        Exercise 9    Exercise Name 9 -- Isometric shoulder abduction  -LN     Cueing 9 -- Verbal;Tactile;Demo  -LN     Reps 9 -- 10  -LN     Time 9 -- 5 sec  -LN     Additional Comments -- vs other hand  -LN        Exercise 10    Exercise Name 10 -- Rows vs TB  -LN     Cueing 10 -- Verbal;Tactile;Demo  -LN     Reps 10 -- 10  -LN     Additional Comments -- green TB  -LN        Exercise 11    Exercise Name 11 -- rhythmic stabilization  -LN     Cueing 11 -- Verbal;Tactile;Demo  -LN     Reps 11 -- x 25 distal  -LN               User Key  (r) = Recorded By, (t) = Taken By, (c) = Cosigned By      Initials Name Provider Type    Binta Lopes, PT Physical Therapist                             Manual Rx (Last 36 Hours)       Manual Treatments       Row Name 05/15/25 1100             Total Minutes    06982 - PT Manual Therapy Minutes 12  -LN         Manual Rx 1    Manual Rx 1 Location R shoulder  -LN      Manual Rx 1 Type AA/PROM for R shoulder flexion; abduction/scaption; IR, and ER.  -LN      Manual Rx 1 Duration 7-8 reps each to tolerance  -LN                User Key  (r) = Recorded By, (t) = Taken By, (c) = Cosigned By      Initials Name Provider Type    Binta Lopes, PT Physical Therapist                        Therapy Education  Education Details: Pt to add rows vs TB to HEP as  tolerated. Issued green TB for home. Pt to continue with HEP 1-2 x day and use HP/CP PRN. Pt to do active supine shoulder flexion at home instead of wrist grab as tolerated.  Given: HEP, Symptoms/condition management, Pain management, Posture/body mechanics  Program: New, Reinforced, Progressed (added rows vs TB; progress to active supine shoulder flexion instead of wrist grab as tolerated.)  How Provided: Verbal, Demonstration, Written  Provided to: Patient  Level of Understanding: Teach back education performed, Verbalized, Demonstrated              Time Calculation:   Start Time: 1100  Stop Time: 1200  Time Calculation (min): 60 min  Timed Charges  32652 - PT Therapeutic Exercise Minutes: 25  37604 - PT Manual Therapy Minutes: 12  Untimed Charges  PT E-Stim Unattended Minutes: 15  PT Moist Heat Minutes: 10 (pre-Rx; 15 min post-Rx with IFC)  Total Minutes  Timed Charges Total Minutes: 12  Untimed Charges Total Minutes: 25   Total Minutes: 12  Therapy Charges for Today       Code Description Service Date Service Provider Modifiers Qty    86871432280 HC PT ELECTRICAL STIM UNATTENDED 5/15/2025 Binta Gaitan, PT  1    89232757545 HC PT THER PROC EA 15 MIN 5/15/2025 Binta Gaitan, PT GP 1    21110882486 HC PT MANUAL THERAPY EA 15 MIN 5/15/2025 Binta Gaitan, PT GP 1                      Binta Gaitan, PT  5/15/2025

## 2025-05-22 ENCOUNTER — HOSPITAL ENCOUNTER (OUTPATIENT)
Dept: PHYSICAL THERAPY | Facility: HOSPITAL | Age: 80
Setting detail: THERAPIES SERIES
Discharge: HOME | End: 2025-05-22
Payer: MEDICARE

## 2025-05-22 DIAGNOSIS — M94.211 CHONDROMALACIA OF RIGHT SHOULDER: ICD-10-CM

## 2025-05-22 DIAGNOSIS — S46.011A TRAUMATIC TEAR OF RIGHT ROTATOR CUFF, INITIAL ENCOUNTER: ICD-10-CM

## 2025-05-22 DIAGNOSIS — M19.019 AC JOINT ARTHROPATHY: Primary | ICD-10-CM

## 2025-05-22 PROCEDURE — G0283 ELEC STIM OTHER THAN WOUND: HCPCS

## 2025-05-22 PROCEDURE — 97110 THERAPEUTIC EXERCISES: CPT

## 2025-05-22 PROCEDURE — 97140 MANUAL THERAPY 1/> REGIONS: CPT

## 2025-05-22 NOTE — THERAPY TREATMENT NOTE
"  Outpatient Physical Therapy Ortho Treatment Note   [facility]     Patient Name: Huyen Alvarez  : 1945  MRN: [id number]  Today's Date: 2025        Visit Date: 2025    Visit Dx:    ICD-10-CM ICD-9-CM   1. AC joint arthropathy  M19.019 719.91   2. Traumatic tear of right rotator cuff, initial encounter  S46.011A 840.4   3. Chondromalacia of right shoulder  M94.211 733.92       There is no problem list on file for this patient.       Past Medical History:   Diagnosis Date    Arthritis     Atrial fibrillation     Hypertension     Stroke     TIA (transient ischemic attack)         Past Surgical History:   Procedure Laterality Date    CYST REMOVAL          PT Ortho       Row Name 25 1200       Subjective    Subjective Comments \"My shoulder is doing good; no pain reported.\"  Pt still reports a little tightness in R UT but less. -LN       Precautions and Contraindications    Precautions/Limitations no known precautions/limitations  -LN       Subjective Pain    Able to rate subjective pain? yes  -LN    Pre-Treatment Pain Level 0  -LN    Subjective Pain Comment No present pain reported.  -LN              User Key  (r) = Recorded By, (t) = Taken By, (c) = Cosigned By      Initials Name Provider Type    Binta Lopes, PT Physical Therapist                                 PT Assessment/Plan       Row Name 25 1300          PT Assessment    Assessment Comments Pt continues to report no pain in right shoulder and continues to report good functional use of R UE with ADLs and all her daily activities. Pt tolerated PROM R shoulder with no pain but does still report tightness at end-range of ER. Pt with decreased R UT tightness but minimal persists. She continues to tolerate exercises well and doing well with HEP. Progressed IR & ER strengthening to TB as she tolerated well. Feel pt is close to being ready for discharge with HEP; will reassess pt on 25 after she gets back from being out " "of town.  -LN        PT Plan    PT Frequency --  -LN     Predicted Duration of Therapy Intervention (PT) see below  -LN     PT Plan Comments Continue per POC. Pt is out of town next week. Next appt. will be on 6/5/25 and if pt is still doing well, will plan on discharging pt with HEP at that time.  -LN               User Key  (r) = Recorded By, (t) = Taken By, (c) = Cosigned By      Initials Name Provider Type    Binta Lopes, PT Physical Therapist                     Modalities       Row Name 05/22/25 1200             Moist Heat    MH Applied Yes  -LN      Location R shoulder with pt seated with R arm supported on a pillow  -LN      PT Moist Heat Minutes 10  pre-Rx; 15 min post-Rx with IFC  -LN      MH Prior to Rx Yes  -LN      MH S/P Rx Yes  -LN         ELECTRICAL STIMULATION    Attended/Unattended Unattended  -LN      Stimulation Type IFC  -LN      Location/Electrode Placement/Other R UT/levator/lateral shoulder and posterior shoulder with MH.  Pt requested MH with IFC today.  -LN      PT E-Stim Unattended Minutes 15  -LN                User Key  (r) = Recorded By, (t) = Taken By, (c) = Cosigned By      Initials Name Provider Type    Binta Lopes, PT Physical Therapist                   OP Exercises       Row Name 05/22/25 1200 05/22/25 1100          Precautions    Existing Precautions/Restrictions no known precautions/restrictions  -LN --        Subjective    Subjective Comments \"My shoulder is doing good; no pain reported.\"  -LN --        Subjective Pain    Able to rate subjective pain? yes  -LN --     Pre-Treatment Pain Level 0  -LN --     Subjective Pain Comment No present pain reported.  -LN --        Total Minutes    00543 - PT Therapeutic Exercise Minutes 25  -LN --     30072 - PT Manual Therapy Minutes -- 12  -LN        Exercise 1    Exercise Name 1 supine active shoulder flexion  -LN --     Cueing 1 Verbal;Tactile;Demo  -LN --     Reps 1 10  -LN --     Time 1 5 sec  -LN --     "    Exercise 2    Exercise Name 2 supine cane exercise for AA shoulder ER  -LN --     Cueing 2 Verbal;Tactile;Demo  -LN --     Reps 2 10  -LN --     Time 2 5 sec  -LN --        Exercise 3    Exercise Name 3 Cross body arm stretch  -LN --     Cueing 3 Verbal;Tactile;Demo  -LN --     Reps 3 5  -LN --     Time 3 10 sec  -LN --        Exercise 4    Exercise Name 4 Scapular retraction  -LN --     Cueing 4 Verbal;Tactile;Demo  -LN --     Reps 4 10  -LN --     Time 4 5 sec  -LN --        Exercise 5    Exercise Name 5 Codman pendulum cw & ccw  -LN --     Cueing 5 Verbal;Tactile;Demo  -LN --     Reps 5 20 each  -LN --        Exercise 6    Exercise Name 6 UT stretch (R) with hand on head  -LN --     Cueing 6 Verbal;Tactile;Demo  -LN --     Reps 6 5 reps  -LN --     Time 6 10 sec  -LN --     Additional Comments hand on top of head  -LN --        Exercise 7    Exercise Name 7 Isometric shoulder IR  -LN --     Cueing 7 Verbal;Tactile;Demo  -LN --     Reps 7 --  -LN --     Time 7 --  -LN --        Exercise 8    Exercise Name 8 Isometric shoulder ER  -LN --     Cueing 8 Verbal;Tactile;Demo  -LN --     Reps 8 --  -LN --     Time 8 --  -LN --        Exercise 9    Exercise Name 9 Isometric shoulder abduction  -LN --     Cueing 9 Verbal;Tactile;Demo  -LN --     Reps 9 10  -LN --     Time 9 5 sec  -LN --     Additional Comments vs other hand  -LN --        Exercise 10    Exercise Name 10 Rows vs TB  -LN --     Cueing 10 Verbal;Tactile;Demo  -LN --     Reps 10 10  -LN --     Additional Comments green TB  -LN --        Exercise 11    Exercise Name 11 rhythmic stabilization  -LN --     Cueing 11 Verbal;Tactile;Demo  -LN --     Reps 11 x 25 distal  -LN --        Exercise 12    Exercise Name 12 IR vs TB  -LN --     Cueing 12 Verbal;Tactile;Demo  -LN --     Reps 12 10  -LN --     Additional Comments green TB  -LN --        Exercise 13    Exercise Name 13 ER vs TB  -LN --     Cueing 13 Verbal;Tactile;Demo  -LN --     Reps 13 10  -LN --      Additional Comments green TB  -LN --               User Key  (r) = Recorded By, (t) = Taken By, (c) = Cosigned By      Initials Name Provider Type    Binta Lopes, PT Physical Therapist                             Manual Rx (Last 36 Hours)       Manual Treatments       Row Name 05/22/25 1100             Total Minutes    75116 - PT Manual Therapy Minutes 12  -LN         Manual Rx 1    Manual Rx 1 Location R shoulder  -LN      Manual Rx 1 Type AA/PROM for R shoulder flexion; abduction/scaption; IR, and ER.  -LN      Manual Rx 1 Duration 7-8 reps each to tolerance  -LN                User Key  (r) = Recorded By, (t) = Taken By, (c) = Cosigned By      Initials Name Provider Type    Binta Lopes, PT Physical Therapist                        Therapy Education  Education Details: Pt to add shoulder IR vs TB and shoulder ER vs TB to HEP as tolerated; pt has green TB at home already. Pt to continue with HEP 1-2 x day and use HP/CP PRN.  Given: HEP, Symptoms/condition management, Pain management, Posture/body mechanics  Program: New, Reinforced, Progressed (added shoulder IR vs TB and shoulder ER vs TB)  How Provided: Verbal, Demonstration, Written  Provided to: Patient  Level of Understanding: Teach back education performed, Verbalized, Demonstrated              Time Calculation:   Start Time: 1257  Stop Time: 1350  Time Calculation (min): 53 min  Timed Charges  57252 - PT Therapeutic Exercise Minutes: 25  23378 - PT Manual Therapy Minutes: 12  Untimed Charges  PT E-Stim Unattended Minutes: 15  PT Moist Heat Minutes: 10 (pre-Rx; 15 min post-Rx with IFC)  Total Minutes  Timed Charges Total Minutes: 25  Untimed Charges Total Minutes: 25   Total Minutes: 50  Therapy Charges for Today       Code Description Service Date Service Provider Modifiers Qty    69034318856 HC PT THER PROC EA 15 MIN 5/22/2025 Binta Gaitan, PT GP 1    66811817610 HC PT MANUAL THERAPY EA 15 MIN 5/22/2025 Arnie  Binta Norris, PT GP 1    [id number]  PT ELECTRICAL STIM UNATTENDED 5/22/2025 Binta Gaitan, PT  1                      Binta Gaitan, PT  5/22/2025

## 2025-06-05 ENCOUNTER — APPOINTMENT (OUTPATIENT)
Dept: PHYSICAL THERAPY | Facility: HOSPITAL | Age: 80
End: 2025-06-05
Payer: MEDICARE

## 2025-06-11 ENCOUNTER — HOSPITAL ENCOUNTER (OUTPATIENT)
Dept: PHYSICAL THERAPY | Facility: HOSPITAL | Age: 80
Setting detail: THERAPIES SERIES
Discharge: HOME | End: 2025-06-11
Payer: MEDICARE

## 2025-06-11 DIAGNOSIS — M19.019 AC JOINT ARTHROPATHY: Primary | ICD-10-CM

## 2025-06-11 DIAGNOSIS — S46.011A TRAUMATIC TEAR OF RIGHT ROTATOR CUFF, INITIAL ENCOUNTER: ICD-10-CM

## 2025-06-11 DIAGNOSIS — M94.211 CHONDROMALACIA OF RIGHT SHOULDER: ICD-10-CM

## 2025-06-11 PROCEDURE — 97110 THERAPEUTIC EXERCISES: CPT

## 2025-06-11 PROCEDURE — G0283 ELEC STIM OTHER THAN WOUND: HCPCS

## 2025-06-11 PROCEDURE — 97140 MANUAL THERAPY 1/> REGIONS: CPT

## 2025-06-11 NOTE — THERAPY RE-EVALUATION
"  Outpatient Physical Therapy Ortho Re-Evaluation   [facility]     Patient Name: Huyen Alvarez  : 1945  MRN: [id number]  Today's Date: 2025          Visit Date: 2025    There is no problem list on file for this patient.       Past Medical History:   Diagnosis Date    Arthritis     Atrial fibrillation     Hypertension     Stroke     TIA (transient ischemic attack)         Past Surgical History:   Procedure Laterality Date    CYST REMOVAL         Visit Dx:     ICD-10-CM ICD-9-CM   1. AC joint arthropathy  M19.019 719.91   2. Traumatic tear of right rotator cuff, initial encounter  S46.011A 840.4   3. Chondromalacia of right shoulder  M94.211 733.92              PT Ortho       Row Name 25 1000       Transfers    Comment, (Transfers) Pt independent with all functional mobility and transfers.  -LN       Gait/Stairs (Locomotion)    Comment, (Gait/Stairs) Pt independent with gait without any AD used.  -LN      Row Name 25 0900       Subjective    Subjective Comments \"My shoulder has been good but, I can tell I haven't been here in awhile.\" I haven't been able to do the exercises as much just because I have been so busy and was out of town.\"  -LN       Precautions and Contraindications    Precautions/Limitations no known precautions/limitations  -LN       Subjective Pain    Pre-Treatment Pain Level 0  -LN    Subjective Pain Comment No shoulder pain; but c/o LBP.  -LN       Shoulder Girdle Palpation    Subacromial Space Right:;Tender  -LN    Supraspinatus Insertion Right:;Tender  -LN    Upper Trap Right:;Tender;Guarded/taut  minimal noted  -LN    Levator Scapula Right:;Tender;Guarded/taut  minimal noted  -LN       Head/Neck/Trunk    Neck Extension AROM WFL  -LN    Neck Flexion AROM WFL  -LN    Neck Lt Lateral Flexion AROM WFL-tightness  -LN    Neck Rt Lateral Flexion AROM WFL-tightness  -LN    Neck Lt Rotation AROM WFL  -LN    Neck Rt Rotation AROM WFL  -LN       Right Upper Ext    Rt Shoulder " Abduction AROM 180 degrees  -LN    Rt Shoulder Abduction PROM 180 degrees- scaption  -LN    Rt Shoulder Flexion AROM 175 degrees  -LN    Rt Shoulder Flexion PROM 180 degrees  -LN    Rt Shoulder External Rotation AROM 80 degrees  -LN    Rt Shoulder External Rotation PROM 90 degrees  -LN    Rt Shoulder Internal Rotation AROM 90 degrees  -LN    Rt Shoulder Internal Rotation PROM 90 degrees  -LN    Rt Elbow Extension/Flexion AROM WNL  -LN    Rt Elbow Supination AROM WNL  -LN    Rt Elbow Pronation AROM WNL  -LN       MMT Right Upper Ext    Rt Shoulder Flexion MMT, Gross Movement (4+/5) good plus  -LN    Rt Shoulder Extension MMT, Gross Movement (5/5) normal  -LN    Rt Shoulder ABduction MMT, Gross Movement (4+/5) good plus  -LN    Rt Shoulder ADduction MMT, Gross Movement (5/5) normal  -LN    Rt Shoulder Internal Rotation MMT, Gross Movement (5/5) normal  -LN    Rt Shoulder External Rotation MMT, Gross Movement (4+/5) good plus  -LN    Rt Elbow Flexion MMT, Gross Movement: (5/5) normal  -LN    Rt Elbow Extension MMT, Gross Movement: (4+/5) good plus  -LN       Sensation    Sensation WNL? WNL  -LN    Light Touch No apparent deficits  -LN    Additional Comments no c/o any N/T in UE except occasional N/T in B hands.  -LN       Upper Extremity Flexibility    Upper Trapezius Right:;Mildly limited  -LN    Levator Scapula Right:;Mildly limited  -LN    Pect Minor Right:;Mildly limited  -LN              User Key  (r) = Recorded By, (t) = Taken By, (c) = Cosigned By      Initials Name Provider Type    LN Binta Gaitan, PT Physical Therapist                                Therapy Education  Education Details: Pt to add  B shoulder extension vs TB to HEP as tolerated and increase all TB exercises to 15 reps. Pt advised to try and get exercises done daily, but at least every other day; manny her TB exercises. Pt to use HP/CP PRN. Pt has green TB at home already.  Given: HEP, Symptoms/condition management, Posture/body  mechanics  Program: New, Reinforced, Progressed (added B shoulder extension vs TB)  How Provided: Verbal, Demonstration, Written  Provided to: Patient  Level of Understanding: Teach back education performed, Verbalized, Demonstrated        PT OP Goals       Row Name 06/11/25 1000          PT Short Term Goals    STG Date to Achieve --  all STG met  -LN     STG 1 Pt to verbally report decreased R shoulder/UT pain to <5/10 at worst with ADLs and everyday activities.  -LN     STG 1 Progress Met  -LN     STG 1 Progress Comments Pt rates pain at 0/10 now.  -LN     STG 2 Pt independent with initial HEP issued by therapist.  -LN     STG 2 Progress Met  -LN     STG 3 R shoulder AROM improved to 175 degrees flexion, 150 degrees abduction, 80 degrees ER, & 90 degrees IR to allow for improved functional use of R UE without pain.  -LN     STG 3 Progress Met  -LN     STG 3 Progress Comments R shoulder AROM today= flexion 175 degrees, abduction 180 degrees, IR 90 degrees, and ER 80 degrees.  -LN     STG 4 R shoulder and elbow strength improved by 1/2 mm grade.  -LN     STG 4 Progress Met  -LN     STG 5 R UT/levator muscle guarding decreased to minimal.  -LN     STG 5 Progress Met  -LN        Long Term Goals    LTG Date to Achieve 07/09/25  -LN     LTG 1 Pt to verbally report decreased R shoulder/UT pain to <3/10 at worst with ADLs and everyday activities.  -LN     LTG 1 Progress Met  -LN     LTG 1 Progress Comments Pt rates pain at 0/10 now.  -LN     LTG 2 Pt independent with more advanced HEP issued by therapist.  -LN     LTG 2 Progress Ongoing;Progressing  -LN     LTG 3 R shoulder AROM improved to WFL-WNL all planes.  -LN     LTG 3 Progress Partially Met;Ongoing;Progressing  -LN     LTG 3 Progress Comments met for all planes except active shoulder ER= 80 degrees.  -LN     LTG 4 R shoulder strength improved to 4+-5/5.  -LN     LTG 4 Progress Met  -LN     LTG 5 R elbow strength improved to 5/5.  -LN     LTG 5 Progress Partially  Met;Ongoing;Progressing  -LN     LTG 5 Progress Comments met for biceps; triceps= 4+/5  -LN     LTG 6 R UT/levator muscle guarding decreased to nominal.  -LN     LTG 6 Progress Ongoing;Progressing  -LN     LTG 6 Progress Comments decreased to minimal  -LN     LTG 7 CROM WFL & painfree all planes.  -LN     LTG 7 Progress Partially Met;Ongoing;Progressing  -LN     LTG 7 Progress Comments CROM WFL all planes but with c/o tightness/discomfort with B lateral flexion.  -LN        Time Calculation    PT Goal Re-Cert Due Date 07/09/25  -LN               User Key  (r) = Recorded By, (t) = Taken By, (c) = Cosigned By      Initials Name Provider Type    Binta Lopes, PT Physical Therapist                     PT Assessment/Plan       Row Name 06/11/25 1000          PT Assessment    Assessment Comments Pt continues to report no pain in right shoulder and continues to report good functional use of R UE with ADLs and all her daily activities. Pt tolerated PROM R shoulder well, but does still report tightness & discomfort at end-range of ER.  Pt with decreased R UT/levator tightness, but minimal persists. Pt now with WFL CROM in all planes but does reports minimal discomfort and tightness with B lateral flexion. She continues to tolerate exercises well and doing well with HEP; although having some trouble getting them done eveyday. Progressed TB exercises & she tolerated well. Pt with good improvement in strength of R shoulder but still has weakness in shoulder flexion, abduction, ER, and triceps. Pt with improved R shoulder PROM to WFL-WNL all planes and improved AROM in all planes but still is limited by 5 degrees in flexion and 10 degrees in ER. She has met all STG and has met 2 out of 7 LTG and partially met 3 LTG. She is making good progress towards all remaining goals. She will benefit from continued PT 1 x week for 3-4 more visits to progress with AROM & strengthening exercises and modalities as needed for pain  "relief and to decrease mm guarding in R UT/levator.  -LN        PT Plan    PT Frequency 1x/week; or every other week -LN     Predicted Duration of Therapy Intervention (PT) 3-4 more visits  -LN     PT Plan Comments Continue per POC; 1 x week for 3- 4 more weeks and then plan on discharge with HEP. Next appointment is scheduled for 6/26/25. Progress exercises as tolerated; IFC/MH/CP PRN. Pt education; kinesiotape PRN. Continue towards all remaining LTG.  -LN               User Key  (r) = Recorded By, (t) = Taken By, (c) = Cosigned By      Initials Name Provider Type    Binta Lopes, PT Physical Therapist                     Modalities       Row Name 06/11/25 0900             Precautions    Existing Precautions/Restrictions no known precautions/restrictions  -LN         Subjective Pain    Able to rate subjective pain? yes  -LN         Moist Heat    MH Applied Yes  -LN      Location R shoulder with pt seated with R arm supported on a pillow  -LN      PT Moist Heat Minutes --  15 minutes  -LN      MH Prior to Rx No  -LN      MH S/P Rx Yes  -LN         ELECTRICAL STIMULATION    Attended/Unattended Unattended  -LN      Stimulation Type IFC  -LN      Location/Electrode Placement/Other R UT/levator/lateral shoulder and lateral upper arm with MH.  -LN      PT E-Stim Unattended Minutes 15  -LN                User Key  (r) = Recorded By, (t) = Taken By, (c) = Cosigned By      Initials Name Provider Type    Binta Lopes, PT Physical Therapist                   OP Exercises       Row Name 06/11/25 1000 06/11/25 0900          Precautions    Existing Precautions/Restrictions -- no known precautions/restrictions  -LN        Subjective    Subjective Comments -- \"My shoulder has been good but, I can tell I haven't been here in awhile.\" I haven't been able to do the exercises as much just because I have been so busy and was out of town.\"  -LN        Subjective Pain    Able to rate subjective pain? -- yes  " -LN     Pre-Treatment Pain Level -- 0  -LN     Subjective Pain Comment -- No shoulder pain; but c/o LBP.  -LN        Total Minutes    47398 - PT Therapeutic Exercise Minutes 20  -LN --     23299 - PT Manual Therapy Minutes -- 12  -LN        Exercise 1    Exercise Name 1 supine active shoulder flexion  -LN --     Cueing 1 Verbal;Tactile;Demo  -LN --     Reps 1 10  -LN --     Time 1 5 sec  -LN --        Exercise 2    Exercise Name 2 supine cane exercise for AA shoulder ER  -LN --     Cueing 2 Verbal;Tactile;Demo  -LN --     Reps 2 10  -LN --     Time 2 5 sec  -LN --        Exercise 3    Exercise Name 3 Cross body arm stretch  -LN --     Cueing 3 Verbal;Tactile;Demo  -LN --     Reps 3 5  -LN --     Time 3 10 sec  -LN --        Exercise 4    Exercise Name 4 Scapular retraction  -LN --     Cueing 4 Verbal;Tactile;Demo  -LN --     Reps 4 10  -LN --     Time 4 5 sec  -LN --        Exercise 5    Exercise Name 5 Codman pendulum cw & ccw  -LN --     Cueing 5 Verbal;Tactile;Demo  -LN --     Reps 5 20 each  -LN --        Exercise 6    Exercise Name 6 UT stretch (R) with hand on head  -LN --     Cueing 6 Verbal;Tactile;Demo  -LN --     Reps 6 5 reps  -LN --     Time 6 10 sec  -LN --     Additional Comments hand on top of head  -LN --        Exercise 7    Exercise Name 7 Isometric shoulder IR  -LN --     Cueing 7 Verbal;Tactile;Demo  -LN --        Exercise 8    Exercise Name 8 Isometric shoulder ER  -LN --     Cueing 8 Verbal;Tactile;Demo  -LN --        Exercise 9    Exercise Name 9 Isometric shoulder abduction  -LN --     Cueing 9 Verbal;Tactile;Demo  -LN --     Reps 9 10  -LN --     Time 9 5 sec  -LN --     Additional Comments vs other hand  -LN --        Exercise 10    Exercise Name 10 Rows vs TB  -LN --     Cueing 10 Verbal;Tactile;Demo  -LN --     Reps 10 15  -LN --     Additional Comments green TB  -LN --        Exercise 11    Exercise Name 11 rhythmic stabilization  -LN --     Cueing 11 Verbal;Tactile;Demo  -LN --     Reps  11 20 proximal and 20 distal  -LN --        Exercise 12    Exercise Name 12 IR vs TB  -LN --     Cueing 12 Verbal;Tactile;Demo  -LN --     Reps 12 10  -LN --     Time 12 15  -LN --     Additional Comments green TB  -LN --        Exercise 13    Exercise Name 13 ER vs TB  -LN --     Cueing 13 Verbal;Tactile;Demo  -LN --     Reps 13 15  -LN --     Additional Comments green TB  -LN --        Exercise 14    Exercise Name 14 B shoulder extension vs TB  -LN --     Cueing 14 Verbal;Tactile;Demo  -LN --     Reps 14 15  -LN --     Time 14 green TB  -LN --               User Key  (r) = Recorded By, (t) = Taken By, (c) = Cosigned By      Initials Name Provider Type    Binta Lopes, PT Physical Therapist                  Manual Rx (Last 36 Hours)       Manual Treatments       Row Name 06/11/25 0900             Total Minutes    97464 - PT Manual Therapy Minutes 12  -LN         Manual Rx 1    Manual Rx 1 Location R shoulder  -LN      Manual Rx 1 Type AA/PROM for R shoulder flexion; abduction/scaption; IR, and ER.  -LN      Manual Rx 1 Duration 7-8 reps each to tolerance  -LN                User Key  (r) = Recorded By, (t) = Taken By, (c) = Cosigned By      Initials Name Provider Type    Binta Lopes, PT Physical Therapist                                          Time Calculation:     Start Time: 0955  Stop Time: 1043  Time Calculation (min): 48 min  Timed Charges  81724 - PT Therapeutic Exercise Minutes: 20  99455 - PT Manual Therapy Minutes: 12  Untimed Charges  PT E-Stim Unattended Minutes: 15  PT Moist Heat Minutes:  (15 minutes)  Total Minutes  Timed Charges Total Minutes: 20  Untimed Charges Total Minutes: 15   Total Minutes: 20     Therapy Charges for Today       Code Description Service Date Service Provider Modifiers Qty    96670850922 HC PT MANUAL THERAPY EA 15 MIN 6/11/2025 Binta Gaitan, PT GP 1    99500556102 HC PT ELECTRICAL STIM UNATTENDED 6/11/2025 Binta Gaitan, PT  1     [id number]  PT THER PROC EA 15 MIN 6/11/2025 Binta Gaitan, PT GP 1                      Binta Gaitan, PT  6/11/2025

## 2025-06-26 ENCOUNTER — APPOINTMENT (OUTPATIENT)
Dept: PHYSICAL THERAPY | Facility: HOSPITAL | Age: 80
End: 2025-06-26
Payer: MEDICARE

## 2025-07-01 ENCOUNTER — HOSPITAL ENCOUNTER (OUTPATIENT)
Dept: PHYSICAL THERAPY | Facility: HOSPITAL | Age: 80
Setting detail: THERAPIES SERIES
Discharge: HOME | End: 2025-07-01
Payer: MEDICARE

## 2025-07-01 DIAGNOSIS — M19.019 AC JOINT ARTHROPATHY: Primary | ICD-10-CM

## 2025-07-01 DIAGNOSIS — M94.211 CHONDROMALACIA OF RIGHT SHOULDER: ICD-10-CM

## 2025-07-01 DIAGNOSIS — S46.011A TRAUMATIC TEAR OF RIGHT ROTATOR CUFF, INITIAL ENCOUNTER: ICD-10-CM

## 2025-07-01 PROCEDURE — G0283 ELEC STIM OTHER THAN WOUND: HCPCS

## 2025-07-01 PROCEDURE — 97110 THERAPEUTIC EXERCISES: CPT

## 2025-07-01 PROCEDURE — 97140 MANUAL THERAPY 1/> REGIONS: CPT

## 2025-07-01 NOTE — THERAPY TREATMENT NOTE
"  Outpatient Physical Therapy Ortho Treatment Note  [facility]     Patient Name: Huyen Alvarez  : 1945  MRN: [id number]  Today's Date: 2025      Visit Date: 2025      Visit Dx:    ICD-10-CM ICD-9-CM   1. AC joint arthropathy  M19.019 719.91   2. Traumatic tear of right rotator cuff, initial encounter  S46.011A 840.4   3. Chondromalacia of right shoulder  M94.211 733.92       There is no problem list on file for this patient.       Past Medical History:   Diagnosis Date    Arthritis     Atrial fibrillation     Hypertension     Stroke     TIA (transient ischemic attack)         Past Surgical History:   Procedure Laterality Date    CYST REMOVAL          PT Ortho       Row Name 25 1300       Subjective    Subjective Comments \"My shoulder has been doing pretty good; but I got the flu and then my Uncle  so that is why I haven't been here.\" Pt reports that she has been doing her exercises but not when she was sick.  -LN       Precautions and Contraindications    Precautions/Limitations no known precautions/limitations  -LN       Subjective Pain    Pre-Treatment Pain Level 0  -LN    Subjective Pain Comment No shoulder pain; but c/o mild LBP.  -LN              User Key  (r) = Recorded By, (t) = Taken By, (c) = Cosigned By      Initials Name Provider Type    Binta Lopes, PT Physical Therapist                                 PT Assessment/Plan       Row Name 25 1300          PT Assessment    Assessment Comments Pt continues to report little to no R shoulder pain and she tolerated treatment well. She is doing fairly well with HEP, but did need occasional cueing for proper technique with exercises.  She continues to report benefit from IFC & MH. She also is having decreased R UT tightness. Feel she will be ready for discharge with HEP after 2-3 more visits; pt is in agreement.  -LN        PT Plan    PT Frequency 1x/week  or every other week  -LN     Predicted Duration of Therapy " "Intervention (PT) 2-3 more visits  -LN     PT Plan Comments Continue per POC; 1 x week for 2-3 more weeks and then plan on discharge with HEP.  Progress exercises as tolerated; IFC/MH/CP PRN. Pt education; kinesiotape PRN. Next PT appointment is scheduled for 25.  -LN               User Key  (r) = Recorded By, (t) = Taken By, (c) = Cosigned By      Initials Name Provider Type    Binta Lopes, PT Physical Therapist                     Modalities       Row Name 25 1300             Subjective Pain    Able to rate subjective pain? yes  -LN         Moist Heat    MH Applied Yes  -LN      Location R shoulder with pt seated with R arm supported on a pillow  -LN      PT Moist Heat Minutes --  15 minutes s/p Rx with IFC  -LN      MH Prior to Rx Yes  12 minutes  -LN      MH S/P Rx Yes  -LN         ELECTRICAL STIMULATION    Attended/Unattended Unattended  -LN      Stimulation Type IFC  -LN      Location/Electrode Placement/Other R UT/midscapular//lateral shoulder and anterior shoulder with MH.  -LN      PT E-Stim Unattended Minutes 15  -LN                User Key  (r) = Recorded By, (t) = Taken By, (c) = Cosigned By      Initials Name Provider Type    Binta Lopes, PT Physical Therapist                   OP Exercises       Row Name 25 1300             Precautions    Existing Precautions/Restrictions no known precautions/restrictions  -LN         Subjective    Subjective Comments \"My shoulder has been doing pretty good; but I got the flu and then my Uncle  so that is why I haven't been here.\" Pt reports that she has been doing her exercises but not when she was sick.  -LN         Subjective Pain    Able to rate subjective pain? yes  -LN      Pre-Treatment Pain Level 0  -LN      Subjective Pain Comment No shoulder pain; but c/o mild LBP.  -LN         Total Minutes    15299 - PT Therapeutic Exercise Minutes 20  -LN      81834 - PT Manual Therapy Minutes 12  -LN         Exercise 1    " Exercise Name 1 supine active shoulder flexion  -LN      Cueing 1 Verbal;Tactile;Demo  -LN      Reps 1 10  -LN      Time 1 5 sec  -LN         Exercise 2    Exercise Name 2 supine cane exercise for AA shoulder ER  -LN      Cueing 2 Verbal;Tactile;Demo  -LN      Reps 2 10  -LN      Time 2 5 sec  -LN         Exercise 3    Exercise Name 3 Cross body arm stretch  -LN      Cueing 3 Verbal;Tactile;Demo  -LN      Reps 3 5  -LN      Time 3 10 sec  -LN         Exercise 4    Exercise Name 4 Scapular retraction  -LN      Cueing 4 Verbal;Tactile;Demo  -LN      Reps 4 10  -LN      Time 4 5 sec  -LN         Exercise 5    Exercise Name 5 Codman pendulum cw & ccw  -LN      Cueing 5 Verbal;Tactile;Demo  -LN      Reps 5 --  -LN      Additional Comments HEP  -LN         Exercise 6    Exercise Name 6 UT stretch (R) with hand on head  -LN      Cueing 6 Verbal;Tactile;Demo  -LN      Reps 6 5 reps  -LN      Time 6 10 sec  -LN      Additional Comments hand on top of head  -LN         Exercise 7    Exercise Name 7 --  -LN      Cueing 7 --  -LN         Exercise 8    Exercise Name 8 --  -LN      Cueing 8 --  -LN         Exercise 9    Exercise Name 9 Isometric shoulder abduction  -LN      Cueing 9 Verbal;Tactile;Demo  -LN      Reps 9 10  -LN      Time 9 5 sec  -LN      Additional Comments vs other hand  -LN         Exercise 10    Exercise Name 10 Rows vs TB  -LN      Cueing 10 Verbal;Tactile;Demo  -LN      Reps 10 15  -LN      Additional Comments green TB  -LN         Exercise 11    Exercise Name 11 rhythmic stabilization  -LN      Cueing 11 Verbal;Tactile;Demo  -LN      Reps 11 20 proximal and 20 distal  -LN         Exercise 12    Exercise Name 12 IR vs TB  -LN      Cueing 12 Verbal;Tactile;Demo  -LN      Reps 12 --  -LN      Time 12 15  -LN      Additional Comments green TB  -LN         Exercise 13    Exercise Name 13 ER vs TB  -LN      Cueing 13 Verbal;Tactile;Demo  -LN      Reps 13 15  -LN      Additional Comments green TB  -LN          Exercise 14    Exercise Name 14 B shoulder extension vs TB  -LN      Cueing 14 Verbal;Tactile;Demo  -LN      Reps 14 15  -LN      Time 14 green TB  -LN                User Key  (r) = Recorded By, (t) = Taken By, (c) = Cosigned By      Initials Name Provider Type    Binta Lopes, PT Physical Therapist                             Manual Rx (Last 36 Hours)       Manual Treatments       Row Name 07/01/25 1300             Total Minutes    78944 - PT Manual Therapy Minutes 12  -LN         Manual Rx 1    Manual Rx 1 Location R shoulder  -LN      Manual Rx 1 Type AA/PROM for R shoulder flexion; abduction/scaption; IR, and ER.  -LN      Manual Rx 1 Duration 7-8 reps each to tolerance  -LN                User Key  (r) = Recorded By, (t) = Taken By, (c) = Cosigned By      Initials Name Provider Type    Binta Lopes, PT Physical Therapist                        Therapy Education  Education Details: Pt encouraged to continue with HEP; manny focus on her TB exercises. Pt to try and do exercises daily or at least every other day. Pt to use HP/CP PRN.  Given: HEP, Symptoms/condition management, Posture/body mechanics  Program: Reinforced  How Provided: Demonstration, Verbal  Provided to: Patient  Level of Understanding: Teach back education performed, Verbalized, Demonstrated              Time Calculation:   Start Time: 1305  Stop Time: 1408  Time Calculation (min): 63 min  Timed Charges  18599 - PT Therapeutic Exercise Minutes: 20  12046 - PT Manual Therapy Minutes: 12  Untimed Charges  PT E-Stim Unattended Minutes: 15  PT Moist Heat Minutes:  (15 minutes s/p Rx with IFC)  Total Minutes  Timed Charges Total Minutes: 32  Untimed Charges Total Minutes: 15   Total Minutes: 47  Therapy Charges for Today       Code Description Service Date Service Provider Modifiers Qty    09174833628 HC PT THER PROC EA 15 MIN 7/1/2025 Binta Gaitan, PT GP 1    89660773761 HC PT MANUAL THERAPY EA 15 MIN 7/1/2025  Binta Gaitan, PT GP 1    [id number] HC PT ELECTRICAL STIM UNATTENDED 7/1/2025 Binta Gaitan, PT  1                      Binta Gaitan, PT  7/1/2025

## 2025-07-16 ENCOUNTER — APPOINTMENT (OUTPATIENT)
Dept: PHYSICAL THERAPY | Facility: HOSPITAL | Age: 80
End: 2025-07-16
Payer: MEDICARE

## 2025-07-23 ENCOUNTER — APPOINTMENT (OUTPATIENT)
Dept: PHYSICAL THERAPY | Facility: HOSPITAL | Age: 80
End: 2025-07-23
Payer: MEDICARE